# Patient Record
Sex: MALE | Race: WHITE | NOT HISPANIC OR LATINO | Employment: UNEMPLOYED | URBAN - METROPOLITAN AREA
[De-identification: names, ages, dates, MRNs, and addresses within clinical notes are randomized per-mention and may not be internally consistent; named-entity substitution may affect disease eponyms.]

---

## 2017-01-18 ENCOUNTER — HOSPITAL ENCOUNTER (EMERGENCY)
Facility: CLINIC | Age: 32
Discharge: HOME | End: 2017-01-18
Attending: NURSE PRACTITIONER | Admitting: NURSE PRACTITIONER
Payer: COMMERCIAL

## 2017-01-18 VITALS
HEART RATE: 88 BPM | SYSTOLIC BLOOD PRESSURE: 151 MMHG | DIASTOLIC BLOOD PRESSURE: 97 MMHG | WEIGHT: 300 LBS | HEIGHT: 68 IN | BODY MASS INDEX: 45.47 KG/M2 | RESPIRATION RATE: 14 BRPM | OXYGEN SATURATION: 98 % | TEMPERATURE: 97.1 F

## 2017-01-18 DIAGNOSIS — K04.7 DENTAL ABSCESS: Primary | ICD-10-CM

## 2017-01-18 PROCEDURE — 99283 EMERGENCY DEPT VISIT LOW MDM: CPT | Mod: 25

## 2017-01-18 PROCEDURE — 41800 DRAINAGE OF GUM LESION: CPT | Performed by: NURSE PRACTITIONER

## 2017-01-18 PROCEDURE — 41800 DRAINAGE OF GUM LESION: CPT

## 2017-01-18 PROCEDURE — 99284 EMERGENCY DEPT VISIT MOD MDM: CPT | Mod: 25 | Performed by: NURSE PRACTITIONER

## 2017-01-18 RX ORDER — OXYCODONE AND ACETAMINOPHEN 5; 325 MG/1; MG/1
1-2 TABLET ORAL EVERY 6 HOURS PRN
Qty: 15 TABLET | Refills: 0 | Status: SHIPPED | OUTPATIENT
Start: 2017-01-18 | End: 2018-01-12

## 2017-01-18 RX ORDER — CLINDAMYCIN HCL 300 MG
300 CAPSULE ORAL 4 TIMES DAILY
Qty: 40 CAPSULE | Refills: 0 | Status: SHIPPED | OUTPATIENT
Start: 2017-01-18 | End: 2017-01-28

## 2017-01-18 ASSESSMENT — ENCOUNTER SYMPTOMS: FACIAL SWELLING: 1

## 2017-01-18 NOTE — ED PROVIDER NOTES
History     Chief Complaint   Patient presents with     Dental Problem     The history is provided by the patient.     Jorge Madrid is a 31 year old male who presents to the ED with dental pain. Patient was woken up at 0100 this morning with pain to left upper tooth. He does have minimal left sided facial swelling. He currently does not have a dentist.  Patient denies any other associated symptoms such as nausea, vomiting, fever, chills.    Patient Active Problem List   Diagnosis     Obesity     Bipolar disorder (H)     Tobacco use disorder     Acquired deviated nasal septum     Obesity     ADHD (attention deficit hyperactivity disorder)     Bipolar 2 disorder (H)     Alcohol abuse     Chemical dependency (H)     CARDIOVASCULAR SCREENING; LDL GOAL LESS THAN 160     Past Medical History   Diagnosis Date     Bipolar 2 disorder (H) 2004     ADHD (attention deficit hyperactivity disorder) 2004     Obesity      Tobacco use disorder      Deviated nasal septum        Past Surgical History   Procedure Laterality Date     Surgical history of -   1997     Meatoplasty with urethral dilatation.       Family History   Problem Relation Age of Onset     DIABETES Maternal Grandmother      CEREBROVASCULAR DISEASE Maternal Grandmother      HEART DISEASE Paternal Grandfather        Social History   Substance Use Topics     Smoking status: Current Every Day Smoker -- 0.50 packs/day for 5 years     Smokeless tobacco: Never Used     Alcohol Use: Yes      Comment: rare        Immunization History   Administered Date(s) Administered     MMR 07/15/1991, 03/31/1998     TD (ADULT, 7+) 07/15/1991, 08/31/1998, 04/17/2004          Allergies   Allergen Reactions     No Known Drug Allergies        Current Outpatient Prescriptions   Medication Sig Dispense Refill     IBUPROFEN PO Take 800 mg by mouth every 8 hours as needed for moderate pain         I have reviewed the Medications, Allergies, Past Medical and Surgical History, and Social  "History in the Epic system.    Review of Systems   HENT: Positive for dental problem (left upper) and facial swelling (left side).    All other systems reviewed and are negative.      Physical Exam   BP: (!) 151/97 mmHg  Pulse: 90  Temp: 97.1  F (36.2  C)  Resp: 14  Height: 172.7 cm (5' 8\")  Weight: 136.079 kg (300 lb)  SpO2: 98 %  Physical Exam   Constitutional: He is oriented to person, place, and time. He appears well-developed and well-nourished. No distress.   HENT:   Head: Normocephalic and atraumatic.   Right Ear: Tympanic membrane and external ear normal.   Left Ear: Tympanic membrane and external ear normal.   Mouth/Throat: Oropharynx is clear and moist. No oropharyngeal exudate.   Significant gum erythema to left upper mouth with obvious fluctuant abscess above tooth # 10 & 11.    Eyes: Conjunctivae are normal. Pupils are equal, round, and reactive to light.   Cardiovascular: Normal rate.    Pulmonary/Chest: Effort normal.   Neurological: He is alert and oriented to person, place, and time.   Skin: Skin is warm and dry. No rash noted. He is not diaphoretic.   Psychiatric: He has a normal mood and affect. His behavior is normal.   Nursing note and vitals reviewed.      ED Course   Incision + drainage  Date/Time: 1/19/2017 1:31 PM  Performed by: ANGELA CONROY  Authorized by: ANGELA CONROY  Consent: Verbal consent obtained.  Consent given by: patient  Patient understanding: patient states understanding of the procedure being performed  Patient identity confirmed: verbally with patient  Type: abscess  Body area: mouth  Local anesthetic: attempted Marcaine, patient did not tolerate, used topical   Patient sedated: no  Scalpel size: 11  Incision type: single straight  Incision depth: subcutaneous  Complexity: simple  Drainage: purulent  Drainage amount: moderate  Wound treatment: wound left open  Patient tolerance: Patient tolerated the procedure well with no immediate complications        No " results found for this or any previous visit (from the past 24 hour(s)).  Medications - No data to display      Assessments & Plan (with Medical Decision Making)  Jorge is a 31-year-old male with a history of bipolar disorder, ADHD and obesity who presents to the emergency department today with complaints of left upper tooth pain and facial swelling that started last evening.  Please refer to HPI on focused exam, patient on exam is well-hydrated, he is nontoxic-appearing, he does have some minimal left upper facial swelling, obvious abscess to upper gum noted on exam as noted above.  Abscess was drained without difficulty.  Large amounts of purulent drainage was removed.  I discussed with patient wound care and warm water swishes throughout the day.  I will start patient on clindamycin and provide him with a small supply of Percocet for severe pain.  I did encourage patient to continue with ibuprofen as well.  Patient should see a dentist in the next week, reasons to return to the emergency department were discussed, patient is agreeable to plan of care, questions were answered prior to discharge.    Patient discharged from the emergency department in stable condition.       I have reviewed the nursing notes.    I have reviewed the findings, diagnosis, plan and need for follow up with the patient.    Discharge Medication List as of 1/18/2017  5:51 PM      START taking these medications    Details   clindamycin (CLEOCIN) 300 MG capsule Take 1 capsule (300 mg) by mouth 4 times daily for 10 days, Disp-40 capsule, R-0, E-Prescribe      oxyCODONE-acetaminophen (PERCOCET) 5-325 MG per tablet Take 1-2 tablets by mouth every 6 hours as needed for moderate to severe pain, Disp-15 tablet, R-0, Local Print             Final diagnoses:   Dental abscess   This document serves as a record of services personally performed by Brittaney Garzon AP*. It was created on their behalf by Sintia Dallas, a trained medical scribe.  The creation of this record is based on the provider's personal observations and the statements of the patient. This document has been checked and approved by the attending provider.   Note: Chart documentation done in part with Dragon Voice Recognition software. Although reviewed after completion, some word and grammatical errors may remain.        1/18/2017   [facility] EMERGENCY DEPARTMENT      Brittaney Garzon APRN CNP  01/19/17 0269

## 2017-01-18 NOTE — ED AVS SNAPSHOT
[facility] Emergency Department    [address] [place] [address]    Phone:  [phone]    Fax:  [phone]                                       Jorge Madrid   MRN: [id number]    Department:  [facility] Emergency Department   Date of Visit:  1/18/2017           After Visit Summary Signature Page     I have received my discharge instructions, and my questions have been answered. I have discussed any challenges I see with this plan with the nurse or doctor.    ..........................................................................................................................................  Patient/Patient Representative Signature      ..........................................................................................................................................  Patient Representative Print Name and Relationship to Patient    ..................................................               ................................................  Date                                            Time    ..........................................................................................................................................  Reviewed by Signature/Title    ...................................................              ..............................................  Date                                                            Time

## 2017-01-18 NOTE — ED AVS SNAPSHOT
[facility] Emergency Department    [address] [place] [address]    Phone:  [phone]    Fax:  [phone]                                       Jorge Madrid   MRN: [id number]    Department:  [facility] Emergency Department   Date of Visit:  1/18/2017           Patient Information     Date Of Birth          1985        Your diagnoses for this visit were:     Dental abscess        You were seen by Brittaney Garzon, COURTNEY CNP.      Follow-up Information     Follow up with Tobias Pond MD.    Specialty:  Family Practice    Why:  As needed.  Dentist in 5-7 days    Contact information:    XXX RESIGNED XXX  [address]   [place] [address] [phone]          Discharge Instructions         Dental Abscess    A dental abscess is an infection of the tooth socket. It often starts with a crack or cavity in the tooth. A pocket of pus forms between the tooth and the bone. The infection causes pain and swelling of the gum, cheek, or jaw. The pain is often made worse by drinking hot or cold fluids, or biting on hard foods. Pain may be felt in the facial sinus or in the ear. A severe infection can interfere with swallowing and breathing.  Causes    Cavities    Trauma    Previous dental work  Symptoms    Pain    Swelling around the tooth or face and cheek    Redness    Bad breath    Bad taste in the mouth    Fever  You will be started on an antibiotic. However, final treatment requires drainage of the pus. This can be done by removing the tooth or performing a root canal. A root canal is done by an oral surgeon. It involves drilling an opening in the tooth to drain the pus. After the infection has healed, a crown is placed over the tooth.  Home care  The following guidelines will help you care for your abscess at home:    Avoid hot and cold foods and liquids, since your tooth may be sensitive to temperature changes.    If your tooth is chipped or cracked, or if  "there is a large open cavity, apply oil of cloves (available over-the-counter in drug stores) directly to the tooth to reduce pain. Some drugstores carry an over-the-counter \"toothache kit.\" This contains oil of cloves and a paste, which can be applied over the exposed tooth to decrease sensitivity.    Apply an ice pack (ice cubes in a plastic bag, wrapped in a towel) over the injured area for 10 to 20 minutes every 1 to 2 hours the first day for pain relief. Continue this 3 to 4 times a day until the pain and swelling goes away.    You can take acetaminophen or ibuprofen for pain, unless you were given a different pain medicine to use. (Note: If you have chronic liver or kidney disease, have ever had a stomach ulcer or gastrointestinal bleeding, or are taking blood-thinning medicines, talk with your healthcare provider before using these medicines.)    An antibiotic will be prescribed. Take it as directed until completed, even if you are feeling better sooner.  Follow-up care  Follow up as advised with a dentist or oral surgeon. Even though your pain may improve with the treatment given today, only a dentist or oral surgeon can provide full treatment for this problem.    If a culture was done, you will be notified if the treatment needs to be changed. You can call in as directed for the results.    If X-rays were taken, they will be reviewed by a specialist. You will be notified of the results, especially if they affect treatment.  Call 911  Call emergency services right away if any of these occur:    Trouble breathing or swallowing, or wheezing    Hoarse voice or trouble speaking    Confusion    Extreme drowsiness or trouble awakening    Fainting or loss of consciousness    Rapid heart rate  When to seek medical advice  Call your healthcare provider right away if any of these occur:    Your face or eyelid becomes swollen or red.    Pain worsens or spreads to the neck.    You develop a fever of 100.4 F (38 C) or " higher.    You have unusual drowsiness, a headache or stiff neck, or weakness.    Pus drains from the gum or tooth.    You are unable to open your mouth wide.    5409-1342 [employer]. [address] [place] [address], [place] [address]. All rights reserved. This information is not intended as a substitute for professional medical care. Always follow your healthcare professional's instructions.          24 Hour Appointment Hotline       To make an appointment at any [facility], call [phone] ([phone]). If you don't have a family doctor or clinic, we will help you find one. [facility] are conveniently located to serve the needs of you and your family.             Review of your medicines      START taking        Dose / Directions Last dose taken    clindamycin 300 MG capsule   Commonly known as:  CLEOCIN   Dose:  300 mg   Quantity:  40 capsule        Take 1 capsule (300 mg) by mouth 4 times daily for 10 days   Refills:  0        oxyCODONE-acetaminophen 5-325 MG per tablet   Commonly known as:  PERCOCET   Dose:  1-2 tablet   Quantity:  15 tablet        Take 1-2 tablets by mouth every 6 hours as needed for moderate to severe pain   Refills:  0          Our records show that you are taking the medicines listed below. If these are incorrect, please call your family doctor or clinic.        Dose / Directions Last dose taken    IBUPROFEN PO   Dose:  800 mg        Take 800 mg by mouth every 8 hours as needed for moderate pain   Refills:  0                Prescriptions were sent or printed at these locations (2 Prescriptions)                   [facility] [place] [address] [address]   [address], [facility] [address]    Telephone:  [phone]   Fax:  [phone]   Hours:                  E-Prescribed (1 of 2)         clindamycin (CLEOCIN) 300 MG capsule                 Printed at Department/Unit printer (1 of 2)         oxyCODONE-acetaminophen (PERCOCET) 5-325 MG per  "tablet                Orders Needing Specimen Collection     None      Pending Results     No orders found from 2017 to 2017.            Pending Culture Results     No orders found from 2017 to 2017.            Thank you for choosing [facility]       Thank you for choosing [facility] for your care. Our goal is always to provide you with excellent care. Hearing back from our patients is one way we can continue to improve our services. Please take a few minutes to complete the written survey that you may receive in the mail after you visit with us. Thank you!        [employer] Information     [employer] lets you send messages to your doctor, view your test results, renew your prescriptions, schedule appointments and more. To sign up, go to [email or web address] . Click on \"Log in\" on the left side of the screen, which will take you to the Welcome page. Then click on \"Sign up Now\" on the right side of the page.     You will be asked to enter the access code listed below, as well as some personal information. Please follow the directions to create your username and password.     Your access code is: [email or web address]  Expires: 2017  5:51 PM     Your access code will  in 90 days. If you need help or a new code, please call your [facility] clinic or [phone].        Care EveryWhere ID     This is your Care EveryWhere ID. This could be used by other organizations to access your [facility] medical records  [id number]        After Visit Summary       This is your record. Keep this with you and show to your community pharmacist(s) and doctor(s) at your next visit.                  "

## 2017-01-18 NOTE — DISCHARGE INSTRUCTIONS
"  Dental Abscess    A dental abscess is an infection of the tooth socket. It often starts with a crack or cavity in the tooth. A pocket of pus forms between the tooth and the bone. The infection causes pain and swelling of the gum, cheek, or jaw. The pain is often made worse by drinking hot or cold fluids, or biting on hard foods. Pain may be felt in the facial sinus or in the ear. A severe infection can interfere with swallowing and breathing.  Causes    Cavities    Trauma    Previous dental work  Symptoms    Pain    Swelling around the tooth or face and cheek    Redness    Bad breath    Bad taste in the mouth    Fever  You will be started on an antibiotic. However, final treatment requires drainage of the pus. This can be done by removing the tooth or performing a root canal. A root canal is done by an oral surgeon. It involves drilling an opening in the tooth to drain the pus. After the infection has healed, a crown is placed over the tooth.  Home care  The following guidelines will help you care for your abscess at home:    Avoid hot and cold foods and liquids, since your tooth may be sensitive to temperature changes.    If your tooth is chipped or cracked, or if there is a large open cavity, apply oil of cloves (available over-the-counter in drug stores) directly to the tooth to reduce pain. Some drugstores carry an over-the-counter \"toothache kit.\" This contains oil of cloves and a paste, which can be applied over the exposed tooth to decrease sensitivity.    Apply an ice pack (ice cubes in a plastic bag, wrapped in a towel) over the injured area for 10 to 20 minutes every 1 to 2 hours the first day for pain relief. Continue this 3 to 4 times a day until the pain and swelling goes away.    You can take acetaminophen or ibuprofen for pain, unless you were given a different pain medicine to use. (Note: If you have chronic liver or kidney disease, have ever had a stomach ulcer or gastrointestinal bleeding, or are " taking blood-thinning medicines, talk with your healthcare provider before using these medicines.)    An antibiotic will be prescribed. Take it as directed until completed, even if you are feeling better sooner.  Follow-up care  Follow up as advised with a dentist or oral surgeon. Even though your pain may improve with the treatment given today, only a dentist or oral surgeon can provide full treatment for this problem.    If a culture was done, you will be notified if the treatment needs to be changed. You can call in as directed for the results.    If X-rays were taken, they will be reviewed by a specialist. You will be notified of the results, especially if they affect treatment.  Call 911  Call emergency services right away if any of these occur:    Trouble breathing or swallowing, or wheezing    Hoarse voice or trouble speaking    Confusion    Extreme drowsiness or trouble awakening    Fainting or loss of consciousness    Rapid heart rate  When to seek medical advice  Call your healthcare provider right away if any of these occur:    Your face or eyelid becomes swollen or red.    Pain worsens or spreads to the neck.    You develop a fever of 100.4 F (38 C) or higher.    You have unusual drowsiness, a headache or stiff neck, or weakness.    Pus drains from the gum or tooth.    You are unable to open your mouth wide.    4960-2803 [employer]. [address] [place] [address], [place] [address]. All rights reserved. This information is not intended as a substitute for professional medical care. Always follow your healthcare professional's instructions.

## 2018-01-08 NOTE — PROGRESS NOTES
SUBJECTIVE:                                                    Jorge Madrid is a 32 year old male who presents to clinic today for the following health issues:      HPI    Establish Care  Referral    Problem list and histories reviewed & adjusted, as indicated.  Additional history: as documented    Patient Active Problem List   Diagnosis     Obesity     Bipolar disorder (H)     Tobacco use disorder     Acquired deviated nasal septum     Obesity     ADHD (attention deficit hyperactivity disorder)     Bipolar 2 disorder (H)     CARDIOVASCULAR SCREENING; LDL GOAL LESS THAN 160     H/O cannabis abuse     History of ETOH abuse     Past Surgical History:   Procedure Laterality Date     SURGICAL HISTORY OF -   1997    Meatoplasty with urethral dilatation.       Social History   Substance Use Topics     Smoking status: Current Every Day Smoker     Packs/day: 0.50     Years: 5.00     Smokeless tobacco: Never Used     Alcohol use Yes      Comment: rare     Family History   Problem Relation Age of Onset     DIABETES Maternal Grandmother      CEREBROVASCULAR DISEASE Maternal Grandmother      HEART DISEASE Paternal Grandfather          Current Outpatient Prescriptions   Medication Sig Dispense Refill     divalproex (DEPAKOTE) 500 MG EC tablet Take 1,000 mg by mouth 3 times daily       Amphetamine-Dextroamphetamine (ADDERALL PO)        IBUPROFEN PO Take 800 mg by mouth every 8 hours as needed for moderate pain       Allergies   Allergen Reactions     No Known Drug Allergies      Recent Labs   Lab Test  12/15/09   1313  12/14/09   1238   LDL  135*   --    HDL  65   --    TRIG  92   --    ALT   --   28   TSH  2.35   --       BP Readings from Last 3 Encounters:   01/12/18 136/74   01/18/17 (!) 151/97   01/02/14 126/68    Wt Readings from Last 3 Encounters:   01/12/18 271 lb 1.6 oz (123 kg)   01/18/17 300 lb (136.1 kg)   01/02/14 (!) 313 lb 14.4 oz (142.4 kg)                  Labs reviewed in  "EPIC          ROS:  Constitutional, HEENT, cardiovascular, pulmonary, gi and gu systems are negative, except as otherwise noted.      OBJECTIVE:   /74 (Cuff Size: Adult Large)  Pulse 80  Temp 98.2  F (36.8  C) (Temporal)  Resp 18  Ht 5' 8.5\" (1.74 m)  Wt 271 lb 1.6 oz (123 kg)  BMI 40.62 kg/m2  Body mass index is 40.62 kg/(m^2).  GENERAL: healthy, alert and no distress  HENT: normal cephalic/atraumatic, ear canals and TM's normal, nose and mouth without ulcers or lesions, oropharynx clear and oral mucous membranes moist  NECK: no adenopathy, no asymmetry, masses, or scars and trachea midline and normal to palpation  RESP: lungs clear to auscultation - no rales, rhonchi or wheezes  CV: regular rate and rhythm, normal S1 S2, no S3 or S4, no murmur, click or rub, no peripheral edema and peripheral pulses strong  MS: no gross musculoskeletal defects noted, no edema  PSYCH: fatigued and speech pressured    Diagnostic Test Results:  No results found for this or any previous visit (from the past 24 hour(s)).    ASSESSMENT/PLAN:     1. Tobacco use disorder  Strongly advised cessation.  - TOBACCO CESSATION - FOR HEALTH MAINTENANCE    2. Attention deficit hyperactivity disorder (ADHD), combined type  3. Bipolar 2 disorder (H)  4. History of ETOH abuse  5. H/O cannabis abuse  At this point in time due to the combination medication that he is requesting and his history of substance abuse I would decline to feel any type of scheduled medication.  This needs to be managed by a psychiatrist or his designated provider.  We reviewed the record from 2009 and note that at one point in time is thought that he had been on IV drug and he flatly denies this at this point in time.  I corrected to the best of my ability but note that the presence of this note is definitely in the chart.  He denies IV drug use categorically.  - MENTAL HEALTH REFERRAL  - Adult; Psychiatry and Medication Management; Psychiatry; [facility]: Psychiatry " [facility] [phone]; We will contact you to schedule the appointment or please call with any questions    Return office visit for full physical and willing to take care of this patient's physical needs however at this point time from psychiatric standpoint I may be willing to do 1 month worth of his Adderall but no more than that.  He would also have to subject to a urine toxicology screen at the time of prescription.Answers for HPI/ROS submitted by the patient on 1/12/2018   PHQ-2 Score: 2  If you checked off any problems, how difficult have these problems made it for you to do your work, take care of things at home, or get along with other people?: Not difficult at all  PHQ9 TOTAL SCORE: 0  CECIL 7 TOTAL SCORE: 6      Jonathan Cristina PA-C  [facility] [place]

## 2018-01-12 ENCOUNTER — OFFICE VISIT (OUTPATIENT)
Dept: FAMILY MEDICINE | Facility: OTHER | Age: 33
End: 2018-01-12
Payer: COMMERCIAL

## 2018-01-12 VITALS
BODY MASS INDEX: 40.15 KG/M2 | HEART RATE: 80 BPM | SYSTOLIC BLOOD PRESSURE: 136 MMHG | HEIGHT: 69 IN | WEIGHT: 271.1 LBS | TEMPERATURE: 98.2 F | RESPIRATION RATE: 18 BRPM | DIASTOLIC BLOOD PRESSURE: 74 MMHG

## 2018-01-12 DIAGNOSIS — F17.200 TOBACCO USE DISORDER: Primary | ICD-10-CM

## 2018-01-12 DIAGNOSIS — F31.81 BIPOLAR 2 DISORDER (H): ICD-10-CM

## 2018-01-12 DIAGNOSIS — F10.11 HISTORY OF ETOH ABUSE: ICD-10-CM

## 2018-01-12 DIAGNOSIS — F12.11 H/O CANNABIS ABUSE: ICD-10-CM

## 2018-01-12 DIAGNOSIS — F90.2 ATTENTION DEFICIT HYPERACTIVITY DISORDER (ADHD), COMBINED TYPE: ICD-10-CM

## 2018-01-12 PROCEDURE — 99202 OFFICE O/P NEW SF 15 MIN: CPT | Performed by: PHYSICIAN ASSISTANT

## 2018-01-12 RX ORDER — DIVALPROEX SODIUM 500 MG/1
1000 TABLET, DELAYED RELEASE ORAL 3 TIMES DAILY
COMMUNITY
End: 2018-06-11

## 2018-01-12 ASSESSMENT — ANXIETY QUESTIONNAIRES
1. FEELING NERVOUS, ANXIOUS, OR ON EDGE: MORE THAN HALF THE DAYS
6. BECOMING EASILY ANNOYED OR IRRITABLE: NOT AT ALL
7. FEELING AFRAID AS IF SOMETHING AWFUL MIGHT HAPPEN: NOT AT ALL
GAD7 TOTAL SCORE: 6
GAD7 TOTAL SCORE: 6
2. NOT BEING ABLE TO STOP OR CONTROL WORRYING: MORE THAN HALF THE DAYS
3. WORRYING TOO MUCH ABOUT DIFFERENT THINGS: MORE THAN HALF THE DAYS
4. TROUBLE RELAXING: NOT AT ALL
GAD7 TOTAL SCORE: 6
5. BEING SO RESTLESS THAT IT IS HARD TO SIT STILL: NOT AT ALL
7. FEELING AFRAID AS IF SOMETHING AWFUL MIGHT HAPPEN: NOT AT ALL

## 2018-01-12 ASSESSMENT — PATIENT HEALTH QUESTIONNAIRE - PHQ9
SUM OF ALL RESPONSES TO PHQ QUESTIONS 1-9: 0
SUM OF ALL RESPONSES TO PHQ QUESTIONS 1-9: 0
10. IF YOU CHECKED OFF ANY PROBLEMS, HOW DIFFICULT HAVE THESE PROBLEMS MADE IT FOR YOU TO DO YOUR WORK, TAKE CARE OF THINGS AT HOME, OR GET ALONG WITH OTHER PEOPLE: NOT DIFFICULT AT ALL

## 2018-01-12 ASSESSMENT — PAIN SCALES - GENERAL: PAINLEVEL: NO PAIN (0)

## 2018-01-12 NOTE — LETTER
My Depression Action Plan  Name: Jorge Madrid   Date of Birth 1985  Date: 1/8/2018    My doctor: Yesi Lama   My clinic: [facility]  [address] [address] [phone]          GREEN    ZONE   Good Control    What it looks like:     Things are going generally well. You have normal up s and down s. You may even feel depressed from time to time, but bad moods usually last less than a day.   What you need to do:  1. Continue to care for yourself (see self care plan)  2. Check your depression survival kit and update it as needed  3. Follow your physician s recommendations including any medication.  4. Do not stop taking medication unless you consult with your physician first.           YELLOW         ZONE Getting Worse    What it looks like:     Depression is starting to interfere with your life.     It may be hard to get out of bed; you may be starting to isolate yourself from others.    Symptoms of depression are starting to last most all day and this has happened for several days.     You may have suicidal thoughts but they are not constant.   What you need to do:     1. Call your care team, your response to treatment will improve if you keep your care team informed of your progress. Yellow periods are signs an adjustment may need to be made.     2. Continue your self-care, even if you have to fake it!    3. Talk to someone in your support network    4. Open up your depression survival kit           RED    ZONE Medical Alert - Get Help    What it looks like:     Depression is seriously interfering with your life.     You may experience these or other symptoms: You can t get out of bed most days, can t work or engage in other necessary activities, you have trouble taking care of basic hygiene, or basic responsibilities, thoughts of suicide or death that will not go away, self-injurious behavior.     What you need to do:  1. Call your  care team and request a same-day appointment. If they are not available (weekends or after hours) call your local crisis line, emergency room or 911.      Electronically signed by: Anthony Galaviz, January 8, 2018    Depression Self Care Plan / Survival Kit    Self-Care for Depression  Here s the deal. Your body and mind are really not as separate as most people think.  What you do and think affects how you feel and how you feel influences what you do and think. This means if you do things that people who feel good do, it will help you feel better.  Sometimes this is all it takes.  There is also a place for medication and therapy depending on how severe your depression is, so be sure to consult with your medical provider and/ or Behavioral Health Consultant if your symptoms are worsening or not improving.     In order to better manage my stress, I will:    Exercise  Get some form of exercise, every day. This will help reduce pain and release endorphins, the  feel good  chemicals in your brain. This is almost as good as taking antidepressants!  This is not the same as joining a gym and then never going! (they count on that by the way ) It can be as simple as just going for a walk or doing some gardening, anything that will get you moving.      Hygiene   Maintain good hygiene (Get out of bed in the morning, Make your bed, Brush your teeth, Take a shower, and Get dressed like you were going to work, even if you are unemployed).  If your clothes don't fit try to get ones that do.    Diet  I will strive to eat foods that are good for me, drink plenty of water, and avoid excessive sugar, caffeine, alcohol, and other mood-altering substances.  Some foods that are helpful in depression are: complex carbohydrates, B vitamins, flaxseed, fish or fish oil, fresh fruits and vegetables.    Psychotherapy  I agree to participate in Individual Therapy (if recommended).    Medication  If prescribed medications, I agree to  take them.  Missing doses can result in serious side effects.  I understand that drinking alcohol, or other illicit drug use, may cause potential side effects.  I will not stop my medication abruptly without first discussing it with my provider.    Staying Connected With Others  I will stay in touch with my friends, family members, and my primary care provider/team.    Use your imagination  Be creative.  We all have a creative side; it doesn t matter if it s oil painting, sand castles, or mud pies! This will also kick up the endorphins.    Witness Beauty  (AKA stop and smell the roses) Take a look outside, even in mid-winter. Notice colors, textures. Watch the squirrels and birds.     Service to others  Be of service to others.  There is always someone else in need.  By helping others we can  get out of ourselves  and remember the really important things.  This also provides opportunities for practicing all the other parts of the program.    Humor  Laugh and be silly!  Adjust your TV habits for less news and crime-drama and more comedy.    Control your stress  Try breathing deep, massage therapy, biofeedback, and meditation. Find time to relax each day.     My support system    Clinic Contact:  Phone number:    Contact 1:  Phone number:    Contact 2:  Phone number:    Yazid[employer]/:  Phone number:    Therapist:  Phone number:    Local crisis center:    Phone number:    Other community support:  Phone number:

## 2018-01-12 NOTE — MR AVS SNAPSHOT
After Visit Summary   1/12/2018    Jorge Madrid    MRN: [id number]           Patient Information     Date Of Birth          1985        Visit Information        Provider Department      1/12/2018 3:20 PM Jonathan Pérez PA-C [facility]        Today's Diagnoses     Tobacco use disorder    -  1    Attention deficit hyperactivity disorder (ADHD), combined type        Bipolar 2 disorder (H)        History of ETOH abuse        H/O cannabis abuse           Follow-ups after your visit        Additional Services     MENTAL HEALTH REFERRAL  - Adult; Psychiatry and Medication Management; Psychiatry; [facility]: Psychiatry Clinic [phone]; We will contact you to schedule the appointment or please call with any questions       All scheduling is subject to the client's specific insurance plan & benefits, provider/location availability, and provider clinical specialities.  Please arrive 15 minutes early for your first appointment and bring your completed paperwork.    Please be aware that coverage of these services is subject to the terms and limitations of your health insurance plan.  Call member services at your health plan with any benefit or coverage questions.    May consider Miguelina in [employer].                  Who to contact     If you have questions or need follow up information about today's clinic visit or your schedule please contact [facility] directly at [phone].  Normal or non-critical lab and imaging results will be communicated to you by MyChart, letter or phone within 4 business days after the clinic has received the results. If you do not hear from us within 7 days, please contact the clinic through MyChart or phone. If you have a critical or abnormal lab result, we will notify you by phone as soon as possible.  Submit refill requests through [employer] or call your pharmacy and they will forward the refill request to us. Please allow 3 business days for  "your refill to be completed.          Additional Information About Your Visit        [facility] Information     [employer] lets you send messages to your doctor, view your test results, renew your prescriptions, schedule appointments and more. To sign up, go to [email or web address] . Click on \"Log in\" on the left side of the screen, which will take you to the Welcome page. Then click on \"Sign up Now\" on the right side of the page.     You will be asked to enter the access code listed below, as well as some personal information. Please follow the directions to create your username and password.     Your access code is: [email or web address]  Expires: 3/27/2018  3:14 PM     Your access code will  in 90 days. If you need help or a new code, please call your [place] clinic or [phone].        Care EveryWhere ID     This is your [facility] EveryWhere ID. This could be used by other organizations to access your [place] medical records  [id number]        Your Vitals Were     Pulse Temperature Respirations Height BMI (Body Mass Index)       80 98.2  F (36.8  C) (Temporal) 18 5' 8.5\" (1.74 m) 40.62 kg/m2        Blood Pressure from Last 3 Encounters:   18 136/74   17 (!) 151/97   14 126/68    Weight from Last 3 Encounters:   18 271 lb 1.6 oz (123 kg)   17 300 lb (136.1 kg)   14 (!) 313 lb 14.4 oz (142.4 kg)              We Performed the Following     DEPRESSION ACTION PLAN (DAP)     MENTAL HEALTH REFERRAL  - Adult; Psychiatry and Medication Management; Psychiatry; [facility]: Psychiatry Clinic [phone]; We will contact you to schedule the appointment or please call with any questions     TOBACCO CESSATION - FOR HEALTH MAINTENANCE          Today's Medication Changes          These changes are accurate as of: 18  3:42 PM.  If you have any questions, ask your nurse or doctor.               Stop taking these medicines if you haven't already. Please contact your care team if you have questions.  "    oxyCODONE-acetaminophen 5-325 MG per tablet   Commonly known as:  PERCOCET   Stopped by:  Jonathan Pérez PA-C                    Primary Care Provider Office Phone # Fax #    COURTNEY Mims [phone] [address] [facility] [address]        Equal Access to Services     [facility]: Hadii aad ku hadasho Soomaali, waaxda luqadaha, qaybta kaalmada adeegyada, waxay idiin hayaan adeeg kharash la'aan . So [facility] [phone].    ATENCIÓN: Si habla español, tiene a winter disposición servicios gratuitos de asistencia lingüística. Llame al [phone].    We comply with applicable federal civil rights laws and [place] laws. We do not discriminate on the basis of race, color, national origin, age, disability, sex, sexual orientation, or gender identity.            Thank you!     Thank you for choosing [facility]  for your care. Our goal is always to provide you with excellent care. Hearing back from our patients is one way we can continue to improve our services. Please take a few minutes to complete the written survey that you may receive in the mail after your visit with us. Thank you!             Your Updated Medication List - Protect others around you: Learn how to safely use, store and throw away your medicines at www.disposemymeds.org.          This list is accurate as of: 1/12/18  3:42 PM.  Always use your most recent med list.                   Brand Name Dispense Instructions for use Diagnosis    ADDERALL PO           divalproex 500 MG EC tablet    DEPAKOTE     Take 1,000 mg by mouth 3 times daily        IBUPROFEN PO      Take 800 mg by mouth every 8 hours as needed for moderate pain

## 2018-01-12 NOTE — NURSING NOTE
"Chief Complaint   Patient presents with     Establish Care     Referral     Panel Management     sammi, mychart, flu shot, tobacco cessation, dap, phq, vito       Initial /74 (Cuff Size: Adult Large)  Pulse 80  Temp 98.2  F (36.8  C) (Temporal)  Resp 18  Ht 5' 8.5\" (1.74 m)  Wt 271 lb 1.6 oz (123 kg)  BMI 40.62 kg/m2 Estimated body mass index is 40.62 kg/(m^2) as calculated from the following:    Height as of this encounter: 5' 8.5\" (1.74 m).    Weight as of this encounter: 271 lb 1.6 oz (123 kg).  Medication Reconciliation: complete  "

## 2018-01-13 ASSESSMENT — ANXIETY QUESTIONNAIRES: GAD7 TOTAL SCORE: 6

## 2018-01-13 ASSESSMENT — PATIENT HEALTH QUESTIONNAIRE - PHQ9: SUM OF ALL RESPONSES TO PHQ QUESTIONS 1-9: 0

## 2018-02-12 ENCOUNTER — TELEPHONE (OUTPATIENT)
Dept: PSYCHIATRY | Facility: CLINIC | Age: 33
End: 2018-02-12

## 2018-02-12 ENCOUNTER — OFFICE VISIT (OUTPATIENT)
Dept: PSYCHIATRY | Facility: CLINIC | Age: 33
End: 2018-02-12
Attending: PSYCHIATRY & NEUROLOGY
Payer: COMMERCIAL

## 2018-02-12 VITALS
HEART RATE: 87 BPM | DIASTOLIC BLOOD PRESSURE: 84 MMHG | WEIGHT: 262.6 LBS | BODY MASS INDEX: 39.34 KG/M2 | SYSTOLIC BLOOD PRESSURE: 136 MMHG

## 2018-02-12 DIAGNOSIS — F90.2 ATTENTION DEFICIT HYPERACTIVITY DISORDER (ADHD), COMBINED TYPE: ICD-10-CM

## 2018-02-12 PROCEDURE — G0463 HOSPITAL OUTPT CLINIC VISIT: HCPCS | Mod: ZF

## 2018-02-12 ASSESSMENT — PAIN SCALES - GENERAL: PAINLEVEL: NO PAIN (0)

## 2018-02-12 NOTE — TELEPHONE ENCOUNTER
On 2/12/2018 the patient signed an DENZEL authorizing the release of all pertinent records from [facility] to [facility] for the purpose of continuing care.  Dr. Abernathy faxed this DENZEL to [phone] on 2/12/2018, I sent the original to scanning and held a copy in Psychiatry until scanning complete/confirmed .Saundra Barnes LPN

## 2018-02-12 NOTE — NURSING NOTE
Chief Complaint   Patient presents with     Eval/Assessment     ADHD     Reviewed allergies, medications, pharmacy and smoking status.  Administered abuse screening questions  Obtained weight, pain level, blood pressure and heart rate

## 2018-02-12 NOTE — PROGRESS NOTES
"  Psychiatry Clinic Medical Diagnostic Assessment               Jorge Madrid is a 32 year old male  Therapist: None  PCP: Jonathan Dodd  Other Providers: None  Referred by Jonathan Dodd for evaluation of possible bipolar disorder and ADHD.      History was provided by patient who was a fair to poor historian.    Patient arrived 25 minutes late     Chief Complaint                                                                                                             \"I want to get my mental health back in track\"     History of Present Illness                                                                                   4, 4      Pertinent Background:  This patient reportedly has been diagnosed with ADHD and Bipolar II Disorder in 2012.  Psych critical item history includes substance use treatment  .     He wants to make his mental health a priority.  He reports he was diagnosed with ADHD and Bipolar Disorder, depressive type at age 23.  He reported that he had testing completed at [facility] in [place] and saw saw Dr. Addie Harrington but she retired.  He is currently not on any medications since 2012.  He was on Adderall 60-80 mg daily and Depakote 1000 mg in the past.  That was the first time he ever tried medications before.  He tried lithium and reported it made him feel in a fog, Seroquel- sleep eating and groggy, trazodone- worked okay but didn't need it.  He reported that he felt Depakote and Adderall were going well and was able to keep a job.  Reports that he has always been on Depakote and Adderall together.  He also reportedly tried bupropion but felt it wasn't helpful and made his mind \"groggy\".  Has never tried Concerta, Vyvanse, Strattera.  States that he has never taken more Adderall than prescribed except for once when he forgot that he took it already that day.  He stopped his medications because he missed too many appointments.      He remembers in high school he would stay " "in the art class and would doing really well for a period of time.  He reports feeling down in 5th grade after his grandfather passed away.  He saw a therapist at the [facility] in 8th grade but he wasn't very open to talking to her.  He reports that he feels good for a few hours at a time, states that last occurred in last summer.   He reports that this summer he felt fulfilled and \"was able to keep it together\" and had a job that was pretty accommodating with his lateness.  He stopped working there after he and his ex-wife broke up again.  Denies any history of hearing voices or seeing things others don't.  He describes his \"ups\" as times where he feels more confident, feeling witty, sleep is fine, he buys things that he shouldn't, a little more hyper, \"but mostly in my head\".    Denies times of feeling overly energetic, overly motivated, or feeling too good.  States that other people don't really notice a change in his behavior or mood.  States that he feels \"more social and outgoing\" during these times but sleep is not affected.  The longest that these periods have lasted is a day.      Denies history of suicidal ideation, homicidal ideation, self-injurious behavior, inpatient psychiatric hospitalization.    Describes that his mood as feeling \"unfulfilled\" and \"unmotivated\".  Reports that his sleep stays pretty same and appetite stays the same, states that this is pretty \"situational\" and feels better when he has a routine and pulls his own weight.     In regards to ADHD, he does report that he has difficulty sustaining attention in tasks, has difficulty organizing tasks and activities, loses things, interrupts others, does not follow through on instructions, fidgeting all since childhood.  He was a self-described \"class clown\"    Recent Symptoms:   Depression:  anhedonia, low energy, poor concentration /memory and feeling worthless       Recent Substance Use:  Alcohol- 3-4 beers on the " weekend  Tobacco- qtr to half ppd       Caffeine- a couple sodas/day   Opioids- none        Narcan Kit- No  Cannabis- none   Other Illicit Drugs- none     Substance Use History                                                                 Past Use- cannabis daily, 12 pack beer in one day    Had a history of smoking weed daily and went to treatment in 2012        Psychiatric History     None      Psychiatric Medication Trials     See HPI above    Social/ Family History               [per patient report]                                                       1ea, 1ea     FINANCIAL SUPPORT- unemployed, living with parents       CHILDREN- 5 year-old son.  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 in [place]  SOCIAL/ SPIRITUAL SUPPORT- family       CULTURAL INFLUENCES/ IMPACT- n/a       TRAUMA HISTORY (self-report)- None  FEELS SAFE AT HOME- Yes  FAMILY HISTORY-  Maternal Grandmother with schizophrenia and depression on paternal side of the family    Medical / Surgical History                                                                                                                     Patient Active Problem List   Diagnosis     Class 3 obesity due to excess calories without serious comorbidity with body mass index (BMI) of 40.0 to 44.9 in adult (H)     Bipolar disorder (H)     Tobacco use disorder     Acquired deviated nasal septum     Obesity     ADHD (attention deficit hyperactivity disorder)     Bipolar 2 disorder (H)     CARDIOVASCULAR SCREENING; LDL GOAL LESS THAN 160     H/O cannabis abuse     History of ETOH abuse       Past Surgical History:   Procedure Laterality Date      SURGICAL HISTORY OF -   1997    Meatoplasty with urethral dilatation.      Medical Review of Systems                                                                                                       2, 10     A comprehensive review of systems was performed and is negative other than noted in the HPI.    Allergy                                No known drug allergies  Current Medications                                                                                                         Current Outpatient Prescriptions   Medication Sig Dispense Refill     divalproex (DEPAKOTE) 500 MG EC tablet Take 1,000 mg by mouth 3 times daily       Amphetamine-Dextroamphetamine (ADDERALL PO)        IBUPROFEN PO Take 800 mg by mouth every 8 hours as needed for moderate pain       Vitals                                                                                                                           3, 3     /84  Pulse 87  Wt 119.1 kg (262 lb 9.6 oz)  BMI 39.34 kg/m2     Mental Status Exam                                                                                       9, 14 cog gs     Alertness: alert  and oriented  Appearance: adequately groomed  Behavior/Demeanor: cooperative and pleasant, with fair  eye contact   Speech: normal and regular rate and rhythm  Language: intact and no problems  Psychomotor: normal or unremarkable  Mood: description consistent with euthymia  Affect: blunted; was congruent to mood; was congruent to content  Thought Process/Associations: unremarkable  Thought Content:  Reports none;  Denies suicidal ideation, violent ideation and delusions  Perception:  Reports none;  Denies auditory hallucinations and visual hallucinations  Insight: fair  Judgment: fair  Cognition: (6) does  appear grossly intact; formal cognitive testing was not done  Gait and Station: unremarkable    Labs and Data                                                                                                                      Rating Scales:  PHQ9    PHQ9 Today:  12  PHQ-9 SCORE 11/19/2013 1/12/2018   Total Score 1 -   Total Score MyChart - 0   Total Score - 0         No lab results found.  Recent Labs   Lab Test  12/14/09   1238   AST  32   ALT  28   ALKPHOS  56       Diagnosis and Assessment                                                                               m2, h3     Today the following issues were addressed:    1) ADHD- It seems probable that patient would meet criteria for ADHD Combined presentation (inattentive and hyperactive/impulsive).  States that Adderall was helpful in the past, reports that he completed testing at [facility].  He has signed a DENZEL and I have faxed this request for records.  Will contact patient when records arrive, may discuss case further with Dr. Ambrocio.  Discussed that ADHD therapy may be helpful for organizational and time management skills    2) r/o Bipolar II Disorder- Patient reportedly was diagnosed with Bipolar II Disorder at the same time as ADHD.  Patient may have experienced major depressive episodes in the past but it doesn't appear that he has ever met criteria for meeting a hypomanic episode and thus Bipolar II Disorder.  He states that he felt better when taking Depakote and Adderall and reports that he has always taken the two medications concurrently.  It may be that his ADHD symptoms were adequately controlled and that may have improved his mood.  Will be able to further assess after patient's previous records are reviewed.    3) Substance Use- Patient reports a history of Alcohol Use Disorder and Cannabis Use Disorder for which he received treatment for.  States that he occasionally drinks alcohol but does not use any street drugs, will continue to carefully monitor.    [facility] was checked: no controlled substances prescribed in the past year  PSYCHOTROPIC DRUG INTERACTIONS: None.     Plan                                                                                                                        m2, h3     1) No medication changes today, will await patient's testing and records from his previous psychiatric provider to determine next steps.  Will contact patient to schedule a follow up appointment once these records are reviewed    RTC: to be determined    CRISIS NUMBERS:   Provided routinely in AVS.    Treatment Risk Statement:  The patient understands the risks, benefits, adverse effects and alternatives. Agrees to treatment with the capacity to do so. No medical contraindications to treatment. Agrees to call clinic for any problems. The patient understands to call 911 or go to the nearest ED if life threatening or urgent symptoms occur.     WHODAS 2.0  TODAY total score = N/A; [a 12-item WHODAS 2.0 assessment was not completed by the pt today and/or recorded in EPIC].     PROVIDER:  Carolina Abernathy DO    Attestation:  I, Yury Gamboa, personally performed an examination of this patient on February 12, 2018 and I have reviewed the resident's documentation.  I have edited the note to reflect all relevant changes. I agree with resident findings and plan in this resident H&P.  I have reviewed all vitals and laboratory findings.  Jorge gives a convincing history of long-term symptoms of attention deficit-hyperactivity disorder, combined type.  He also describes symptoms possibly consistent with cyclothymia, or perhaps depressive episodes alternating with subthreshold hypomanic periods, but this is less clear.  Jorge has been diagnosed with bipolar 2 disorder in the past.  He has been on a number of mood stabilizing medications over the years, and reports that he has never taken a stimulant except in combination with a mood stabilizing medication.  To clarify whether there is a mood disorder diagnosis, and if so what type, it will be useful to get Jorge's previous medical records to determine how this diagnosis was  fernando Patel is agreeable with this.  We will not suggest any medication changes today.  We will contact him for follow-up when we review his records..  Yury Gamboa

## 2018-02-12 NOTE — MR AVS SNAPSHOT
After Visit Summary   2018    Jorge Madrid    MRN: [id number]           Patient Information     Date Of Birth          1985        Visit Information        Provider Department      2018 9:00 AM Carolina Abernathy, DO Psychiatry Clinic        Today's Diagnoses     Attention deficit hyperactivity disorder (ADHD), combined type           Follow-ups after your visit        Who to contact     Please call your clinic at [phone] to:    Ask questions about your health    Make or cancel appointments    Discuss your medicines    Learn about your test results    Speak to your doctor            Additional Information About Your Visit        MyChart Information     [employer] is an electronic gateway that provides easy, online access to your medical records. With [employer], you can request a clinic appointment, read your test results, renew a prescription or communicate with your care team.     To sign up for [employer] visit the website at [email or web address]   You will be asked to enter the access code listed below, as well as some personal information. Please follow the directions to create your username and password.     Your access code is: [email or web address]  Expires: 3/27/2018  3:14 PM     Your access code will  in 90 days. If you need help or a new code, please contact your [facility] or call [phone] for assistance.        Care EveryWhere ID     This is your Care EveryWhere ID. This could be used by other organizations to access your [facility] medical records  [id number]        Your Vitals Were     Pulse BMI (Body Mass Index)                87 39.34 kg/m2           Blood Pressure from Last 3 Encounters:   18 136/84   18 136/74   17 (!) 151/97    Weight from Last 3 Encounters:   18 119.1 kg (262 lb 9.6 oz)   18 123 kg (271 lb 1.6 oz)   17 136.1 kg (300 lb)              Today, you had the following     No  orders found for display       Primary Care Provider Office Phone # Fax #    Yesi Lama, COURTNEY [facility] [phone] [address] [address] [place] [address]        Equal Access to Services     BR[place] ARITA : Hadii aad ku hadrenettao Soomaali, waaxda luqadaha, qaybta kaalmada adeegyada, waxla idiin hayflexn adeeg felipe villa . So [facility] [phone].    ATENCIÓN: Si habla español, tiene a winter disposición servicios gratuitos de asistencia lingüística. Llame al [phone].    We comply with applicable federal civil rights laws and [place] laws. We do not discriminate on the basis of race, color, national origin, age, disability, sex, sexual orientation, or gender identity.            Thank you!     Thank you for choosing PSYCHIATRY CLINIC  for your care. Our goal is always to provide you with excellent care. Hearing back from our patients is one way we can continue to improve our services. Please take a few minutes to complete the written survey that you may receive in the mail after your visit with us. Thank you!             Your Updated Medication List - Protect others around you: Learn how to safely use, store and throw away your medicines at www.disposemymeds.org.          This list is accurate as of 2/12/18 11:59 PM.  Always use your most recent med list.                   Brand Name Dispense Instructions for use Diagnosis    ADDERALL PO           divalproex sodium delayed-release 500 MG DR tablet    DEPAKOTE     Take 1,000 mg by mouth 3 times daily        IBUPROFEN PO      Take 800 mg by mouth every 8 hours as needed for moderate pain

## 2018-02-13 ASSESSMENT — PATIENT HEALTH QUESTIONNAIRE - PHQ9: SUM OF ALL RESPONSES TO PHQ QUESTIONS 1-9: 12

## 2018-02-23 ENCOUNTER — TELEPHONE (OUTPATIENT)
Dept: PSYCHIATRY | Facility: CLINIC | Age: 33
End: 2018-02-23

## 2018-02-23 NOTE — TELEPHONE ENCOUNTER
Rec'd incoming faxed records from [facility] (5 pages).     Originals routed to Dr. Abernathy for review prior to submission to scanning.

## 2018-02-27 ENCOUNTER — TELEPHONE (OUTPATIENT)
Dept: PSYCHIATRY | Facility: CLINIC | Age: 33
End: 2018-02-27

## 2018-02-28 NOTE — PROGRESS NOTES
Contacted patient earlier today regarding follow up appt. Received some records from patient s former provider but no psychological testing records.  Discussed patient s care with Deena Gamboa and Cristóbal. Patient will be seen in clinic with me on 3/21 at 2 pm and staffed by Dr. Ambrocio for ADHD concerns.

## 2018-03-21 ENCOUNTER — TELEPHONE (OUTPATIENT)
Dept: PSYCHIATRY | Facility: CLINIC | Age: 33
End: 2018-03-21

## 2018-03-21 NOTE — TELEPHONE ENCOUNTER
Left voicemail for patient to let him know the earliest availability I have is Wednesday, April 4th and to call clinic.   Also notified him that March 28th and 30th are not available.

## 2018-03-22 ENCOUNTER — TELEPHONE (OUTPATIENT)
Dept: PSYCHIATRY | Facility: CLINIC | Age: 33
End: 2018-03-22

## 2018-03-22 NOTE — TELEPHONE ENCOUNTER
Spoke to patient who reports that he is able to change his appt to Wednesday, April 4th at 3:00 pm

## 2018-04-04 ENCOUNTER — TELEPHONE (OUTPATIENT)
Dept: PSYCHIATRY | Facility: CLINIC | Age: 33
End: 2018-04-04

## 2018-04-04 NOTE — TELEPHONE ENCOUNTER
Left a voicemail for patient regarding his cancelled appointment.  In the voicemail message, I have let him know that I thoroughly reviewed his records and after further discussion with my supervisor, feel it would be best for him to be seen by one of the Addiction Psychiatry Specialists within our clinic who could assess how to best manage his reported ADHD symptoms in the context of a history of polysubstance abuse.  Jeana Bragg, Stacey, and Doris have been made aware of his case and have agreed to see him for a consultation.  I told patient to contact clinic to schedule, if he is interested.

## 2018-04-20 ENCOUNTER — TELEPHONE (OUTPATIENT)
Dept: PSYCHIATRY | Facility: CLINIC | Age: 33
End: 2018-04-20

## 2018-04-20 NOTE — TELEPHONE ENCOUNTER
If patient requests to schedule an appointment, please do not allow him to schedule, but let me know.

## 2018-05-15 ENCOUNTER — TELEPHONE (OUTPATIENT)
Dept: PSYCHIATRY | Facility: CLINIC | Age: 33
End: 2018-05-15

## 2018-05-15 DIAGNOSIS — F19.10 POLYSUBSTANCE ABUSE (H): Primary | ICD-10-CM

## 2018-05-15 NOTE — TELEPHONE ENCOUNTER
11:30 AM    I have been in consultation with Deena Grant and Jeana about this patient.  I spoke with him today and he profusely apologized for canceling/missing appts and states that he wants to get his life turned around.  Dr. Hills was agreeable to seeing this patient for a 60 minute eval on Tuesday, 5/22 at 2:30 pm.  I contacted the patient and he is agreeable to being here for that date and time.  I let him know that if he needs to cancel, misses, or is more than 15 minutes late, he will not be allowed to reschedule at our clinic and he states that he completely understands.  I will document this all in a telephone note and he was also instructed to have a Urine Drug Screen completed prior to his appt with Dr. Hills and agrees.

## 2018-05-22 ENCOUNTER — OFFICE VISIT (OUTPATIENT)
Dept: PSYCHIATRY | Facility: CLINIC | Age: 33
End: 2018-05-22
Attending: PSYCHIATRY & NEUROLOGY
Payer: COMMERCIAL

## 2018-05-22 VITALS
BODY MASS INDEX: 40.75 KG/M2 | HEART RATE: 86 BPM | WEIGHT: 272 LBS | SYSTOLIC BLOOD PRESSURE: 119 MMHG | DIASTOLIC BLOOD PRESSURE: 77 MMHG

## 2018-05-22 DIAGNOSIS — F90.2 ATTENTION DEFICIT HYPERACTIVITY DISORDER (ADHD), COMBINED TYPE: Primary | ICD-10-CM

## 2018-05-22 PROCEDURE — G0463 HOSPITAL OUTPT CLINIC VISIT: HCPCS | Mod: ZF

## 2018-05-22 ASSESSMENT — PAIN SCALES - GENERAL: PAINLEVEL: NO PAIN (0)

## 2018-05-22 NOTE — MR AVS SNAPSHOT
After Visit Summary   2018    Jorge Madrid    MRN: [id number]           Patient Information     Date Of Birth          1985        Visit Information        Provider Department      2018 2:30 PM Nerissa Hills MD Psychiatry Clinic        Today's Diagnoses     Attention deficit hyperactivity disorder (ADHD), combined type    -  1       Follow-ups after your visit        Who to contact     Please call your clinic at [phone] to:    Ask questions about your health    Make or cancel appointments    Discuss your medicines    Learn about your test results    Speak to your doctor            Additional Information About Your Visit        MyChart Information     [employer] is an electronic gateway that provides easy, online access to your medical records. With [employer], you can request a clinic appointment, read your test results, renew a prescription or communicate with your care team.     To sign up for [employer] visit the website at [email or web address]   You will be asked to enter the access code listed below, as well as some personal information. Please follow the directions to create your username and password.     Your access code is: [email or web address]  Expires: 2018  5:40 PM     Your access code will  in 90 days. If you need help or a new code, please contact your [facility] or call [phone] for assistance.        Care EveryWhere ID     This is your Care EveryWhere ID. This could be used by other organizations to access your [facility] medical records  [id number]        Your Vitals Were     Pulse BMI (Body Mass Index)                86 40.75 kg/m2           Blood Pressure from Last 3 Encounters:   18 119/77   18 136/84   18 136/74    Weight from Last 3 Encounters:   18 123.4 kg (272 lb)   18 119.1 kg (262 lb 9.6 oz)   18 123 kg (271 lb 1.6 oz)              Today, you had the following     No  orders found for display       Primary Care Provider Office Phone # Fax #    Yesi Lama, COURTNEY [facility] [phone] [address] [address] [place] [address]        Equal Access to Services     [place] ARITA : Hadii aad ku hadrenettao Soomaali, waaxda luqadaha, qaybta kaalmada adeegyada, waxla idiin hayflexn adeeg felipe villa . So [facility] [phone].    ATENCIÓN: Si habla español, tiene a winter disposición servicios gratuitos de asistencia lingüística. Llame al [phone].    We comply with applicable federal civil rights laws and [place] laws. We do not discriminate on the basis of race, color, national origin, age, disability, sex, sexual orientation, or gender identity.            Thank you!     Thank you for choosing PSYCHIATRY CLINIC  for your care. Our goal is always to provide you with excellent care. Hearing back from our patients is one way we can continue to improve our services. Please take a few minutes to complete the written survey that you may receive in the mail after your visit with us. Thank you!             Your Updated Medication List - Protect others around you: Learn how to safely use, store and throw away your medicines at www.disposemymeds.org.          This list is accurate as of 5/22/18  5:40 PM.  Always use your most recent med list.                   Brand Name Dispense Instructions for use Diagnosis    ADDERALL PO           divalproex sodium delayed-release 500 MG DR tablet    DEPAKOTE     Take 1,000 mg by mouth 3 times daily        IBUPROFEN PO      Take 800 mg by mouth every 8 hours as needed for moderate pain
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
## 2018-05-23 ENCOUNTER — TELEPHONE (OUTPATIENT)
Dept: PSYCHIATRY | Facility: CLINIC | Age: 33
End: 2018-05-23

## 2018-05-23 DIAGNOSIS — F90.9 ATTENTION DEFICIT HYPERACTIVITY DISORDER (ADHD), UNSPECIFIED ADHD TYPE: Primary | ICD-10-CM

## 2018-05-23 DIAGNOSIS — F19.90 SUBSTANCE USE DISORDER: ICD-10-CM

## 2018-05-23 RX ORDER — ATOMOXETINE 40 MG/1
40 CAPSULE ORAL DAILY
Qty: 30 CAPSULE | Refills: 0 | Status: SHIPPED | OUTPATIENT
Start: 2018-05-23 | End: 2019-12-13

## 2018-05-23 NOTE — TELEPHONE ENCOUNTER
From 5/22/18 clinic note:     MEDICATIONS:         -- Recommend Strattera as non-stimulant medication for ADHD.  He wants to start this prior to entering treatment on Thursday, which I would support.  He will be better able to focus and make progress if he is able to concentrate .  Recommend starting this tomorrow.  Could start at 40 mg, reassess in 2-3 weeks (phone call from treatment vs in person visit?)       -- He does not want any medication for depression presently and he is stable so would support waiting til after treatment.       Will route to Dr. Hills.

## 2018-05-23 NOTE — TELEPHONE ENCOUNTER
On 5/22/2018 the patient completed a Confidential Clinical Questionnaire.  I sent the document to scanning on 5/23/2018 and held a copy in Psychiatry until scanning complete/confirmed. Noemi Krishnamurthy MA

## 2018-05-23 NOTE — TELEPHONE ENCOUNTER
----- Message from Carolina Rivera RN sent at 5/23/2018  2:00 PM CDT -----  Regarding: FW: New Med Orders - Specker (need to be done today 5/23)  Contact: [phone]      ----- Message -----     From: Olesya Mello     Sent: 5/23/2018   1:38 PM       To: Liliana Pérez RN  Subject: New Med Orders - Specker (need to be done to#    Caller:  pt  Relationship to pt: self  Medication:   Vyvanse  How many days left of med do you have left (if >3 day supply, redirect to pharmacy): new medication  Pharmacy and location: [facility]  Pending appt date:  n/a  Okay to leave detailed VM:  yes    Pt is going into treatment tomorrow, can this be done today?  Intake is at 9am.

## 2018-05-23 NOTE — TELEPHONE ENCOUNTER
Discussed patient's plan with Dr. Hills and with patient.      Patient is starting treatment tomorrow for 28 days (so should be completed 6/21).  Patient would like to be seen for follow up after treatment- he is agreeable to coming to appt with Dr. Hills on Tuesday, 6/26 at 4:00 pm.    Sent atomoxetine/Strattera 40 mg daily, qty: 30 with 0 refills, take 1 capsule po daily, for ADHD to [facility]    I have also ordered standing Drug Screen 8 Urine Testing    Pt's phone number : [phone]- I will update in snapshot

## 2018-05-24 ASSESSMENT — PATIENT HEALTH QUESTIONNAIRE - PHQ9: SUM OF ALL RESPONSES TO PHQ QUESTIONS 1-9: 6

## 2018-05-29 NOTE — TELEPHONE ENCOUNTER
Carolina Abernathy DO Specker, Sheila M, MD; Elizabeth Santo RN        Caller: Unspecified (6 days ago,  2:20 PM)                     I already spoke to him last week and the agreement was Strattera

## 2018-06-11 ENCOUNTER — OFFICE VISIT (OUTPATIENT)
Dept: FAMILY MEDICINE | Facility: CLINIC | Age: 33
End: 2018-06-11
Payer: COMMERCIAL

## 2018-06-11 VITALS
TEMPERATURE: 97.5 F | BODY MASS INDEX: 44.7 KG/M2 | HEIGHT: 67 IN | RESPIRATION RATE: 16 BRPM | SYSTOLIC BLOOD PRESSURE: 116 MMHG | DIASTOLIC BLOOD PRESSURE: 70 MMHG | OXYGEN SATURATION: 98 % | HEART RATE: 90 BPM | WEIGHT: 284.8 LBS

## 2018-06-11 DIAGNOSIS — E66.01 MORBID OBESITY (H): ICD-10-CM

## 2018-06-11 DIAGNOSIS — Z00.00 ENCOUNTER FOR ROUTINE ADULT HEALTH EXAMINATION WITHOUT ABNORMAL FINDINGS: Primary | ICD-10-CM

## 2018-06-11 DIAGNOSIS — F31.9 BIPOLAR AFFECTIVE DISORDER, REMISSION STATUS UNSPECIFIED (H): ICD-10-CM

## 2018-06-11 DIAGNOSIS — F10.11 HISTORY OF ETOH ABUSE: ICD-10-CM

## 2018-06-11 DIAGNOSIS — Z11.3 SCREEN FOR STD (SEXUALLY TRANSMITTED DISEASE): ICD-10-CM

## 2018-06-11 DIAGNOSIS — F51.04 PSYCHOPHYSIOLOGICAL INSOMNIA: ICD-10-CM

## 2018-06-11 DIAGNOSIS — F90.0 ATTENTION DEFICIT HYPERACTIVITY DISORDER (ADHD), PREDOMINANTLY INATTENTIVE TYPE: ICD-10-CM

## 2018-06-11 PROCEDURE — 87591 N.GONORRHOEAE DNA AMP PROB: CPT | Performed by: FAMILY MEDICINE

## 2018-06-11 PROCEDURE — 87491 CHLMYD TRACH DNA AMP PROBE: CPT | Performed by: FAMILY MEDICINE

## 2018-06-11 PROCEDURE — 99395 PREV VISIT EST AGE 18-39: CPT | Performed by: FAMILY MEDICINE

## 2018-06-11 RX ORDER — TRAZODONE HYDROCHLORIDE 50 MG/1
50 TABLET, FILM COATED ORAL
Qty: 30 TABLET | Refills: 1 | Status: SHIPPED | OUTPATIENT
Start: 2018-06-11 | End: 2019-04-04

## 2018-06-11 ASSESSMENT — PAIN SCALES - GENERAL: PAINLEVEL: MODERATE PAIN (4)

## 2018-06-11 NOTE — PROGRESS NOTES
SUBJECTIVE:   CC: Jorge Madrid is an 32 year old male who presents for preventative health visit.     Healthy Habits:    Do you get at least three servings of calcium containing foods daily (dairy, green leafy vegetables, etc.)? yes    Amount of exercise or daily activities, outside of work: not lately    Problems taking medications regularly No    Medication side effects: possibly Strattera might be causing him headaches    Have you had an eye exam in the past two years? yes    Do you see a dentist twice per year? no    Do you have sleep apnea, excessive snoring or daytime drowsiness?yes - daytime drowsiness      Jorge is here today for his general physical with a couple of concerns.      1.  ADHD follow-up.  Was started on Strattera by a psychiatrist not too long ago.  Doing well with no side effect.  Need that refilled.  Denies of having depression or anxiety problem.  He was diagnosed with bipolar disorder for years.  He thinks he was misdiagnosis.  Not taking medication and is feeling great.  No mood swing problem.  No concern about it.    2.  Insomnia - trouble with falling and maintaining sleep.  Melatonin has not been effective.  He is currently at the [facility] for inpatient rehab for alcoholism.  States that he has no problem alcohol.  He drank only socially.  He get a DUI and therefore he was instructed for rehab.  The last time he drank was in May 4, 2018.  He used to smoke marijuana but quit in 2009.  No other drug use.    3.  Otherwise, he is doing well. She last physical exam was couple years ago and it was normal.  No major medical care or procedure done since the last physical. She denies of headache or dizziness, No acute visual change. No URI symptoms inlcudes running nose, nasal congestion, coughing, fever or chill. No CP/SOB. No N/V/D/C. Denies of having any problem with urination.  No abdominal pain.  Single and has not been sexually active for 2-3 years.  Denies any risk for  STI and he has no history of it.  No leg swelling and denies of having any pain. Stated that he does not exercises regularly.  Feels safe.   with one child.  No weight change. No other concern today      Today's PHQ-2 Score:   PHQ-2 ( 1999 [employer]) 6/11/2018 1/12/2018   Q1: Little interest or pleasure in doing things 0 1   Q2: Feeling down, depressed or hopeless 0 1   PHQ-2 Score 0 2   Q1: Little interest or pleasure in doing things - Several days   Q2: Feeling down, depressed or hopeless - Several days   PHQ-2 Score - 2       Abuse: Current or Past(Physical, Sexual or Emotional)- No  Do you feel safe in your environment - Yes    Social History   Substance Use Topics     Smoking status: Current Every Day Smoker     Packs/day: 0.50     Years: 17.00     Types: Cigarettes     Smokeless tobacco: Never Used      Comment: started age 15     Alcohol use No      Comment: rare      If you drink alcohol do you typically have >3 drinks per day or >7 drinks per week? No                      Last PSA: No results found for: PSA    Reviewed orders with patient. Reviewed health maintenance and updated orders accordingly - Yes  Patient Active Problem List   Diagnosis     Bipolar disorder (H)     Tobacco use disorder     Acquired deviated nasal septum     ADHD (attention deficit hyperactivity disorder)     Bipolar 2 disorder (H)     H/O cannabis abuse     History of ETOH abuse     Morbid obesity (H)     Tobacco dependence     Past Surgical History:   Procedure Laterality Date     SURGICAL HISTORY OF -   1997    Meatoplasty with urethral dilatation.       Social History   Substance Use Topics     Smoking status: Current Every Day Smoker     Packs/day: 0.50     Years: 17.00     Types: Cigarettes     Smokeless tobacco: Never Used      Comment: started age 15     Alcohol use No      Comment: last use  5/4/18     Family History   Problem Relation Age of Onset     No Known Problems Mother      No Known Problems Father      DIABETES  Maternal Grandmother      CEREBROVASCULAR DISEASE Maternal Grandmother      No Known Problems Maternal Grandfather      No Known Problems Paternal Grandmother      HEART DISEASE Paternal Grandfather          Current Outpatient Prescriptions   Medication Sig Dispense Refill     atomoxetine (STRATTERA) 40 MG capsule Take 1 capsule (40 mg) by mouth daily 30 capsule 0     traZODone (DESYREL) 50 MG tablet Take 1 tablet (50 mg) by mouth nightly as needed for sleep 30 tablet 1     Allergies   Allergen Reactions     No Known Drug Allergies        Reviewed and updated as needed this visit by clinical staff  Tobacco  Allergies  Meds  Soc Hx        Reviewed and updated as needed this visit by Provider        Past Medical History:   Diagnosis Date     ADHD (attention deficit hyperactivity disorder) 2004     Bipolar 2 disorder (H) 2004     Deviated nasal septum      Obesity      Tobacco use disorder       Past Surgical History:   Procedure Laterality Date     SURGICAL HISTORY OF -   1997    Meatoplasty with urethral dilatation.       ROS:  CONSTITUTIONAL: NEGATIVE for fever, chills, change in weight  INTEGUMENTARY/SKIN: NEGATIVE for worrisome rashes, moles or lesions  EYES: NEGATIVE for vision changes or irritation  ENT: NEGATIVE for ear, mouth and throat problems  RESP: NEGATIVE for significant cough or SOB  CV: NEGATIVE for chest pain, palpitations or peripheral edema  GI: NEGATIVE for nausea, abdominal pain, heartburn, or change in bowel habits   male: negative for dysuria, hematuria, decreased urinary stream, erectile dysfunction, urethral discharge  MUSCULOSKELETAL: NEGATIVE for significant arthralgias or myalgia  NEURO: NEGATIVE for weakness, dizziness or paresthesias  ENDOCRINE: NEGATIVE for temperature intolerance, skin/hair changes  HEME/ALLERGY/IMMUNE: NEGATIVE for bleeding problems  PSYCHIATRIC: NEGATIVE for changes in mood or affect    OBJECTIVE:   /70 (BP Location: Right arm, Patient Position: Sitting,  "Cuff Size: Adult Large)  Pulse 90  Temp 97.5  F (36.4  C) (Temporal)  Resp 16  Ht 5' 6.9\" (1.699 m)  Wt 284 lb 12.8 oz (129.2 kg)  SpO2 98%  BMI 44.74 kg/m2  EXAM:  GENERAL: healthy, alert and no distress  EYES: Eyes grossly normal to inspection, PERRL and conjunctivae and sclerae normal  HENT: ear canals and TM's normal, nose and mouth without ulcers or lesions.  No nasal congestion.  Oropharynx is pink and moist.  No tender with palpation to the sinuses.  NECK: no adenopathy, no asymmetry or scars and thyroid normal to palpation  RESP: lungs clear to auscultation - no rales, rhonchi or wheezes  CV: regular rate and rhythm, normal S1 S2, no S3 or S4, no murmur.  ABDOMEN: soft, no hepatosplenomegaly, no masses and bowel sounds normal.  No tender with palpation.  MS: no gross musculoskeletal defects noted, no edema.  Walk with no limping, normal gait.  All 4 extremities are equally in strength. Ankle, knees, hips, shoulders, elbows and wrists exams normal.  Normal fine motor skills on fingers.  Back is straight, no lordosis or scoliosis.  No tender with palpation.  SKIN: no suspicious lesions or rashes  NEURO: Normal strength and tone, mentation intact and speech normal. Cranial nerves II through XII are +2 throughout.  No focal neurological deficit.  No asterixis.  PSYCH: mentation appears normal, affect normal/bright  LYMPH: no cervical, supraclavicular or axillary adenopathy.   (male): Offered and recommended but he declined.  He has no concern.      ASSESSMENT/PLAN:   1. Encounter for routine adult health examination without abnormal findings  Overall healthy.  UTD for immunization.  Topic appropriate for his age discussed include safety issue and healthy diet.  Also discussed about substance abuse prevention, STD prevention.  Recommend aerobic exercise daily for at least 30 minutes.  Feels safe.  Follow in 1 year, earlier as needed.  Lab as ordered.      2. Attention deficit hyperactivity disorder " (ADHD), predominantly inattentive type  Stable and is doing well.  Continue with the Strattera which were prescribed by the psychiatrist.  No side effect from the Strattera.  Follow-up with the psychiatrist as scheduled which is about a month from today.    3. History of ETOH abuse  Currently inpatient rehab with recent history ofDUI.  Last drink was about a month ago.  No withdrawal symptoms or craving.  Will continue with the rehab and encouraged him to keep the good work.  Labs include a CMP today.    4. Morbid obesity (H)  Discussed with patient about the nature of the condition and its long term and short term effects.  Encourage him to continue with daily aerobic exercise and healthy diet.  Discussed about portioned diet as well.  Recommended him to set a goal of losing 2-4 # a month and work toward that with healthy life style modification.  Discussed with patient the goal for his weight and his BMI.  TSH level was checked today and it was normal.      5. Psychophysiological insomnia  Discussed with him about nature of the condition.  Good sleeping hygiene was dicussed.  Also discuss with him ways to reduce and adjust to stress.  Avoid high caffeinated beaverages after 3 pm.  Avoid any stimulant in the bedroom, include TV, radio, light or reading book.  Get up if not able to fall sleep in 30 minutes and go back to bed again when feel tire.  Avoid taking naps during the day complete.  Also recommend healthy diet and daily regular exercise.  Start him on trazodone 50 mg at bedtime and side effect discussed.  Call in if not improve or worsening.    - traZODone (DESYREL) 50 MG tablet; Take 1 tablet (50 mg) by mouth nightly as needed for sleep  Dispense: 30 tablet; Refill: 1    6. Screen for STD (sexually transmitted disease)  STD prevention and safe sex discussed.  - NEISSERIA GONORRHOEA PCR  - CHLAMYDIA TRACHOMATIS PCR    7. Bipolar affective disorder, remission status unspecified (H)  Not on any medication.   "Stable and is doing well.  Follow-up with the psychiatrist as scheduled..      COUNSELING:  Reviewed preventive health counseling, as reflected in patient instructions       Regular exercise       Healthy diet/nutrition       Alcohol Use       Family planning       Safe sex practices/STD prevention       Consider Hep C screening for patients born between 1945 and 1965       reports that he has been smoking Cigarettes.  He has a 8.50 pack-year smoking history. He has never used smokeless tobacco.  Tobacco Cessation Action Plan: Information offered: Patient not interested at this time  Estimated body mass index is 44.74 kg/(m^2) as calculated from the following:    Height as of this encounter: 5' 6.9\" (1.699 m).    Weight as of this encounter: 284 lb 12.8 oz (129.2 kg).   Weight management plan: Discussed healthy diet and exercise guidelines and patient will follow up in 12 months in clinic to re-evaluate.    Counseling Resources:  ATP IV Guidelines  Pooled Cohorts Equation Calculator  FRAX Risk Assessment  ICSI Preventive Guidelines  Dietary Guidelines for Americans, 2010  USDA's MyPlate  ASA Prophylaxis  Lung CA Screening    Anna Crawley Mai, MD  [facility]  "

## 2018-06-11 NOTE — MR AVS SNAPSHOT
After Visit Summary   6/11/2018    Jorge Madrid    MRN: [id number]           Patient Information     Date Of Birth          1985        Visit Information        Provider Department      6/11/2018 4:00 PM Anna Cortez MD [facility]        Today's Diagnoses     Encounter for routine adult health examination without abnormal findings    -  1    Attention deficit hyperactivity disorder (ADHD), predominantly inattentive type        History of ETOH abuse        Morbid obesity (H)        Psychophysiological insomnia        Screen for STD (sexually transmitted disease)          Care Instructions      Preventive Health Recommendations  Male Ages 26 - 39    Yearly exam:             See your health care provider every year in order to  o   Review health changes.   o   Discuss preventive care.    o   Review your medicines if your doctor has prescribed any.    You should be tested each year for STDs (sexually transmitted diseases), if you re at risk.     After age 35, talk to your provider about cholesterol testing. If you are at risk for heart disease, have your cholesterol tested at least every 5 years.     If you are at risk for diabetes, you should have a diabetes test (fasting glucose).  Shots: Get a flu shot each year. Get a tetanus shot every 10 years.     Nutrition:    Eat at least 5 servings of fruits and vegetables daily.     Eat whole-grain bread, whole-wheat pasta and brown rice instead of white grains and rice.     Talk to your provider about Calcium and Vitamin D.     Lifestyle    Exercise for at least 150 minutes a week (30 minutes a day, 5 days a week). This will help you control your weight and prevent disease.     Limit alcohol to one drink per day.     No smoking.     Wear sunscreen to prevent skin cancer.     See your dentist every six months for an exam and cleaning.             Follow-ups after your visit        Your next 10 appointments already scheduled     Jn  "2018  4:00 PM CDT   Adult Med Follow UP with Nerissa Hills MD   Psychiatry Clinic ([facility])    [place] [id number]  [address] [place] [id number] [phone]              Who to contact     If you have questions or need follow up information about today's clinic visit or your schedule please contact [facility] directly at [phone].  Normal or non-critical lab and imaging results will be communicated to you by [employer], letter or phone within 4 business days after the clinic has received the results. If you do not hear from us within 7 days, please contact the clinic through [employer] or phone. If you have a critical or abnormal lab result, we will notify you by phone as soon as possible.  Submit refill requests through [employer] or call your pharmacy and they will forward the refill request to us. Please allow 3 business days for your refill to be completed.          Additional Information About Your Visit        [employer] Information     [employer] lets you send messages to your doctor, view your test results, renew your prescriptions, schedule appointments and more. To sign up, go to [email or web address] . Click on \"Log in\" on the left side of the screen, which will take you to the Welcome page. Then click on \"Sign up Now\" on the right side of the page.     You will be asked to enter the access code listed below, as well as some personal information. Please follow the directions to create your username and password.     Your access code is: [email or web address]  Expires: 2018  5:40 PM     Your access code will  in 90 days. If you need help or a new code, please call your [facility] clinic or [phone].        Care EveryWhere ID     This is your Care EveryWhere ID. This could be used by other organizations to access your [facility] medical records  [id number]        Your Vitals Were     Pulse Temperature Respirations Height Pulse Oximetry " "BMI (Body Mass Index)    90 97.5  F (36.4  C) (Temporal) 16 5' 6.9\" (1.699 m) 98% 44.74 kg/m2       Blood Pressure from Last 3 Encounters:   06/11/18 116/70   01/12/18 136/74   01/18/17 (!) 151/97    Weight from Last 3 Encounters:   06/11/18 284 lb 12.8 oz (129.2 kg)   01/12/18 271 lb 1.6 oz (123 kg)   01/18/17 300 lb (136.1 kg)              We Performed the Following     CHLAMYDIA TRACHOMATIS PCR     NEISSERIA GONORRHOEA PCR          Today's Medication Changes          These changes are accurate as of 6/11/18  5:17 PM.  If you have any questions, ask your nurse or doctor.               Start taking these medicines.        Dose/Directions    traZODone 50 MG tablet   Commonly known as:  DESYREL   Used for:  Psychophysiological insomnia   Started by:  Anna Cortez MD        Dose:  50 mg   Take 1 tablet (50 mg) by mouth nightly as needed for sleep   Quantity:  30 tablet   Refills:  1         Stop taking these medicines if you haven't already. Please contact your care team if you have questions.     ADDERALL PO   Stopped by:  Anna Cortez MD                Where to get your medicines      These medications were sent to [facility] [place] [id number] [facility]   [address] [facility] , [facility] [address]     Phone:  [phone]     traZODone 50 MG tablet                Primary Care Provider Office Phone # Fax #    Yesi Danae Lama, APRN [facility] [phone] [address] [address] [place] [address]        Equal Access to Services     [facility] AH: Hadii jaycee suero Sodarrell, waaxda luqadaha, qaybta kaalmada nilda, david bush. So [facility] [place] [phone].    ATENCIÓN: Si habla español, tiene a winter disposición servicios gratuitos de asistencia lingüística. Llame al [phone].    We comply with applicable federal civil rights laws and [place] laws. We do not discriminate on the basis of race, color, national origin, age, disability, sex, sexual " orientation, or gender identity.            Thank you!     Thank you for choosing [facility]  for your care. Our goal is always to provide you with excellent care. Hearing back from our patients is one way we can continue to improve our services. Please take a few minutes to complete the written survey that you may receive in the mail after your visit with us. Thank you!             Your Updated Medication List - Protect others around you: Learn how to safely use, store and throw away your medicines at www.disposemymeds.org.          This list is accurate as of 6/11/18  5:17 PM.  Always use your most recent med list.                   Brand Name Dispense Instructions for use Diagnosis    atomoxetine 40 MG capsule    STRATTERA    30 capsule    Take 1 capsule (40 mg) by mouth daily    Attention deficit hyperactivity disorder (ADHD), unspecified ADHD type       traZODone 50 MG tablet    DESYREL    30 tablet    Take 1 tablet (50 mg) by mouth nightly as needed for sleep    Psychophysiological insomnia

## 2018-06-11 NOTE — NURSING NOTE
Chief Complaint   Patient presents with     Physical     There are no preventive care reminders to display for this patient.    Kiet Chavez, [facility]

## 2018-06-12 LAB
C TRACH DNA SPEC QL NAA+PROBE: NEGATIVE
N GONORRHOEA DNA SPEC QL NAA+PROBE: NEGATIVE
SPECIMEN SOURCE: NORMAL
SPECIMEN SOURCE: NORMAL

## 2018-06-13 ENCOUNTER — TELEPHONE (OUTPATIENT)
Dept: FAMILY MEDICINE | Facility: CLINIC | Age: 33
End: 2018-06-13

## 2018-06-13 PROBLEM — E66.01 MORBID OBESITY (H): Status: ACTIVE | Noted: 2018-06-13

## 2018-06-13 NOTE — TELEPHONE ENCOUNTER
The patient called back and information has been given.   Thank you,  Radhika Villa   for [facility]

## 2018-06-13 NOTE — TELEPHONE ENCOUNTER
Left message for patient to return call. Please give message below:    Notes Recorded by Anna Cortez MD on 6/12/2018 at 9:23 PM  Please let patient know that his test for gonorrhea and Chlamydia were negative.

## 2018-06-15 ENCOUNTER — TELEPHONE (OUTPATIENT)
Dept: PSYCHIATRY | Facility: CLINIC | Age: 33
End: 2018-06-15

## 2018-06-15 NOTE — TELEPHONE ENCOUNTER
----- Message from Carolina Rivera RN sent at 6/15/2018 11:05 AM CDT -----  Regarding: FW: Care Coordination  Contact: [phone]      ----- Message -----     From: Silviano Manzanares     Sent: 6/15/2018  10:54 AM       To: Liliana Pérez RN  Subject: Care Coordination                                Caller:  Self  Medication:  Atomoxetine   Pharmacy and location:  [address]    [phone] (Phone)  [phone] (Fax)  Symptoms: Headaches/Light sensitivity (Eye)  Symptom onset: 2wk   Pending appt: 6/26/18  Okay to leave VM: Yes    Thanks

## 2018-06-15 NOTE — TELEPHONE ENCOUNTER
Called the number provided in the message and left VM requesting clarification on what he needs - whether he needs a refill or is reporting side effects.    Will route to Dr. Hills for FYI.

## 2018-06-18 ENCOUNTER — CARE COORDINATION (OUTPATIENT)
Dept: PSYCHIATRY | Facility: CLINIC | Age: 33
End: 2018-06-18

## 2018-06-18 ENCOUNTER — TELEPHONE (OUTPATIENT)
Dept: FAMILY MEDICINE | Facility: CLINIC | Age: 33
End: 2018-06-18

## 2018-06-18 NOTE — PROGRESS NOTES
Appt history:  Last seen:  2/12/18 (Dr. Abernathy); 5/22/18 (Dr. Hills)  RTC:  TBD  Cancel:  3/21/18, 4/4/18 with Dr. Abernathy  No-show:  none  Next appt:  Not scheduled    At last appt 5/22/18:  Start Strattera 40 mg daily    Returned call to pt:  Currently taking Strattera 40 mg qam which he started right away after last appt.  After 1-1.5 weeks on medication began experiencing dull headaches.  Headaches start 45-60 min after he takes the medication in the morning and last for a few hours.  Severity of headaches vary.  Has tried Tylenol QD-BID with mixed results.  Eye movement and light make headaches worse.  Denies history of HA's or migraines prior to this.  Benefits include possible improvement in fidgeting.  Was also recently started on Trazodone for sleep but HA's began before starting this medication.  Currently at [facility] treatment program and believes he will be there for another week before returning home.  Will notify Dr. Abernathy and Jeana and seek recommendations.  Pt agreeable to returning to clinic for an appt but asks that this be scheduled after 7/1/18.  Pt confirms detailed msg can be left at [phone].

## 2018-06-18 NOTE — PROGRESS NOTES
Med Side effects  Received: Today       Olesya Mcdonald Katherine J, RN       Phone Number: [phone]                     Pt left [place] over the weekend, would like return call regarding side effects from his ADHD meds.      Side Effects  Received: Today       Ursula Red Katherine J, RN       Phone Number: [phone]                     Marylin Hortarick is calling to discuss some potential side effects he is experiencing with Strattera.  He would like a call back at [phone].  He will have the phone with him, but it is ok to leave a message if he doesn't answer.     Thank you,   Ursula MENDOZA

## 2018-06-18 NOTE — PROGRESS NOTES
Spoke with Dr. Abernathy who recommends:   -ok to stop Strattera   -pt will need to be seen to discuss possibility of starting an alternative medication  -pt has appt on 6/26 with Dr. Hills       Call to pt:   -no answer at number provided  -lvm with above information  -requested that he call clinic if unable to attend appt on 6/26

## 2018-06-19 NOTE — TELEPHONE ENCOUNTER
Patient called back and would like them faxed over to [employer]. Fax number is . Any other questions please call the patient back.       Thank you,  Kenzie Cherry   for [facility]

## 2018-06-19 NOTE — TELEPHONE ENCOUNTER
Forms are complete but can not fax patient needs to come in and sign the release of info on his form. Forms are in [employer] basket    Left message for patient to return call.  Delia Martin MA 6/19/2018

## 2018-06-20 NOTE — TELEPHONE ENCOUNTER
Spoke to [employer] there fax is confidential faxed the forms to patient.  Delia Martin MA 6/20/2018

## 2018-06-21 ENCOUNTER — OFFICE VISIT (OUTPATIENT)
Dept: URGENT CARE | Facility: RETAIL CLINIC | Age: 33
End: 2018-06-21
Payer: COMMERCIAL

## 2018-06-21 ENCOUNTER — TELEPHONE (OUTPATIENT)
Dept: PSYCHIATRY | Facility: CLINIC | Age: 33
End: 2018-06-21

## 2018-06-21 VITALS
SYSTOLIC BLOOD PRESSURE: 159 MMHG | TEMPERATURE: 98.6 F | DIASTOLIC BLOOD PRESSURE: 131 MMHG | HEART RATE: 83 BPM | OXYGEN SATURATION: 98 %

## 2018-06-21 DIAGNOSIS — K08.89 PAIN, DENTAL: ICD-10-CM

## 2018-06-21 DIAGNOSIS — K04.7 DENTAL INFECTION: Primary | ICD-10-CM

## 2018-06-21 PROCEDURE — 99202 OFFICE O/P NEW SF 15 MIN: CPT | Performed by: NURSE PRACTITIONER

## 2018-06-21 RX ORDER — CEPHALEXIN 500 MG/1
500 CAPSULE ORAL 3 TIMES DAILY
Qty: 30 CAPSULE | Refills: 0 | Status: SHIPPED | OUTPATIENT
Start: 2018-06-21 | End: 2018-07-01

## 2018-06-21 RX ORDER — IBUPROFEN 800 MG/1
800 TABLET, FILM COATED ORAL EVERY 8 HOURS PRN
Qty: 60 TABLET | Refills: 1 | Status: SHIPPED | OUTPATIENT
Start: 2018-06-21 | End: 2019-12-13

## 2018-06-21 NOTE — LETTER
June 21, 2018      RE: Jorge [facility]  [address] [facility] [address]         To Whom It May Concern,    Jorge should discontinue taking Strattera 40 mg daily as of 6/18/18.  Please contact the clinic with any questions.      Sincerely,        Carolina Abernathy, DO

## 2018-06-21 NOTE — MR AVS SNAPSHOT
"              After Visit Summary   2018    Jorge Madrid    MRN: [id number]           Patient Information     Date Of Birth          1985        Visit Information        Provider Department      2018 1:40 PM Rogelio Madrigal APRN CNP [facility]        Today's Diagnoses     Dental infection    -  1    Pain, dental           Follow-ups after your visit        Your next 10 appointments already scheduled     2018  4:00 PM CDT   Adult Med Follow UP with Nerissa Hills MD   Psychiatry Clinic ([facility])    [place] [id number]  [address] [address] [phone]              Who to contact     You can reach your care team any time of the day by calling [phone].  Notification of test results:  If you have an abnormal lab result, we will notify you by phone as soon as possible.         Additional Information About Your Visit        MyChart Information     [employer] lets you send messages to your doctor, view your test results, renew your prescriptions, schedule appointments and more. To sign up, go to [email or web address] . Click on \"Log in\" on the left side of the screen, which will take you to the Welcome page. Then click on \"Sign up Now\" on the right side of the page.     You will be asked to enter the access code listed below, as well as some personal information. Please follow the directions to create your username and password.     Your access code is: [email or web address]  Expires: 2018  5:40 PM     Your access code will  in 90 days. If you need help or a new code, please call your [facility] clinic or [phone].        Care EveryWhere ID     This is your Care EveryWhere ID. This could be used by other organizations to access your [facility] medical records  [id number]        Your Vitals Were     Pulse Temperature Pulse Oximetry             83 98.6  F (37  C) (Oral) 98%          Blood Pressure " from Last 3 Encounters:   06/21/18 (!) 159/131   06/11/18 116/70   01/12/18 136/74    Weight from Last 3 Encounters:   06/11/18 284 lb 12.8 oz (129.2 kg)   01/12/18 271 lb 1.6 oz (123 kg)   01/18/17 300 lb (136.1 kg)              Today, you had the following     No orders found for display         Today's Medication Changes          These changes are accurate as of 6/21/18  1:58 PM.  If you have any questions, ask your nurse or doctor.               Start taking these medicines.        Dose/Directions    cephALEXin 500 MG capsule   Commonly known as:  KEFLEX   Used for:  Pain, dental, Dental infection   Started by:  Rogelio Madrigal APRN CNP        Dose:  500 mg   Take 1 capsule (500 mg) by mouth 3 times daily for 10 days   Quantity:  30 capsule   Refills:  0       ibuprofen 800 MG tablet   Commonly known as:  ADVIL/MOTRIN   Used for:  Pain, dental   Started by:  Rogelio Madrigal APRN CNP        Dose:  800 mg   Take 1 tablet (800 mg) by mouth every 8 hours as needed for moderate pain   Quantity:  60 tablet   Refills:  1       lidocaine (viscous) 2 % solution   Commonly known as:  XYLOCAINE   Used for:  Pain, dental   Started by:  Rogelio Madrigal APRN CNP        5 to 15 ml, At back of throat slowly swish and swallow or spit every 3-8 hours as needed; max 8 doses/24 hour period   Quantity:  100 mL   Refills:  0            Where to get your medicines      These medications were sent to [address]  [address] [facility] [address]     Phone:  [phone]     cephALEXin 500 MG capsule    ibuprofen 800 MG tablet    lidocaine (viscous) 2 % solution                Primary Care Provider Office Phone # Fax #    Yesi COURTNEY Pascual [phone] [address] [address] [place] [address]        Equal Access to Services     [place] ARITA AH: Radha suero Soomaali, waaxda luqadaha, qaybta kaaldavid strickland  ah. So [facility] [phone].    ATENCIÓN: Si callie hale, tiene a winter disposición servicios gratuitos de asistencia lingüística. Elias al [phone].    We comply with applicable federal civil rights laws and [place] laws. We do not discriminate on the basis of race, color, national origin, age, disability, sex, sexual orientation, or gender identity.            Thank you!     Thank you for choosing [facility]  for your care. Our goal is always to provide you with excellent care. Hearing back from our patients is one way we can continue to improve our services. Please take a few minutes to complete the written survey that you may receive in the mail after your visit with us. Thank you!             Your Updated Medication List - Protect others around you: Learn how to safely use, store and throw away your medicines at www.disposemymeds.org.          This list is accurate as of 6/21/18  1:58 PM.  Always use your most recent med list.                   Brand Name Dispense Instructions for use Diagnosis    atomoxetine 40 MG capsule    STRATTERA    30 capsule    Take 1 capsule (40 mg) by mouth daily    Attention deficit hyperactivity disorder (ADHD), unspecified ADHD type       cephALEXin 500 MG capsule    KEFLEX    30 capsule    Take 1 capsule (500 mg) by mouth 3 times daily for 10 days    Pain, dental, Dental infection       ibuprofen 800 MG tablet    ADVIL/MOTRIN    60 tablet    Take 1 tablet (800 mg) by mouth every 8 hours as needed for moderate pain    Pain, dental       lidocaine (viscous) 2 % solution    XYLOCAINE    100 mL    5 to 15 ml, At back of throat slowly swish and swallow or spit every 3-8 hours as needed; max 8 doses/24 hour period    Pain, dental       traZODone 50 MG tablet    DESYREL    30 tablet    Take 1 tablet (50 mg) by mouth nightly as needed for sleep    Psychophysiological insomnia

## 2018-06-21 NOTE — PROGRESS NOTES
SUBJECTIVE:  Jorge Madrid is a 32 year old male here today with tooth pain lower left.   Has been going on for 1 day worsening suddenly.   Relieving Factors:  Nothing   Worse with: Hot and Cold, touching.  Symptoms have been sudden worsening since that time.  Prior history of related problems: Yes some    Past Medical, social, family histories, medications, and allergies reviewed and updated  Current Outpatient Prescriptions   Medication     traZODone (DESYREL) 50 MG tablet     atomoxetine (STRATTERA) 40 MG capsule     No current facility-administered medications for this visit.        OBJECTIVE:   Vitals:    06/21/18 1339   BP: (!) 159/131   Pulse: 83   Temp: 98.6  F (37  C)   TempSrc: Oral   SpO2: 98%     Eye exam is normal - JOE, EOMI, corneas normal.  EARS: canals clear, TM retracted not injected .  Oropharyngeal exam - Fair hygeine. A few teeth missing and several with filling. Red swollen tender area with infection.  The neck is supple and free of adenopathy or masses, the thyroid is normal without enlargement or nodules.    ASSESSMENT:   Dental infection  Pain, dental      PLAN:  Current Outpatient Prescriptions   Medication     cephALEXin (KEFLEX) 500 MG capsule     ibuprofen (ADVIL/MOTRIN) 800 MG tablet     lidocaine, viscous, (XYLOCAINE) 2 % solution     traZODone (DESYREL) 50 MG tablet     atomoxetine (STRATTERA) 40 MG capsule     No current facility-administered medications for this visit.      Blood Pressure reviewed & discussed, Jorge Madrid advised to F/U with PCP.  Improve oral hygeine  Follow up with dentist  Tylenol 1-2 tabs po q4h prn / motrin prn    Rogelio Madrigal MSN, APRN, Family NP-C  [facility]

## 2018-06-26 ENCOUNTER — OFFICE VISIT (OUTPATIENT)
Dept: PSYCHIATRY | Facility: CLINIC | Age: 33
End: 2018-06-26
Attending: PSYCHIATRY & NEUROLOGY
Payer: COMMERCIAL

## 2018-06-26 VITALS
HEART RATE: 89 BPM | SYSTOLIC BLOOD PRESSURE: 113 MMHG | DIASTOLIC BLOOD PRESSURE: 72 MMHG | BODY MASS INDEX: 46.03 KG/M2 | WEIGHT: 293 LBS

## 2018-06-26 DIAGNOSIS — F10.20 ALCOHOL USE DISORDER, SEVERE, DEPENDENCE (H): Primary | ICD-10-CM

## 2018-06-26 PROCEDURE — G0463 HOSPITAL OUTPT CLINIC VISIT: HCPCS | Mod: ZF

## 2018-06-26 RX ORDER — NALTREXONE HYDROCHLORIDE 50 MG/1
50 TABLET, FILM COATED ORAL DAILY
Qty: 30 TABLET | Refills: 1 | Status: SHIPPED | OUTPATIENT
Start: 2018-06-26 | End: 2019-12-13

## 2018-06-26 ASSESSMENT — PAIN SCALES - GENERAL: PAINLEVEL: NO PAIN (0)

## 2018-06-26 NOTE — NURSING NOTE
Chief Complaint   Patient presents with     Recheck Medication     Attention deficit hyperactivity disorder (ADHD), combined type
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
## 2018-06-26 NOTE — MR AVS SNAPSHOT
After Visit Summary   6/26/2018    Jorge Madrid    MRN: [id number]           Patient Information     Date Of Birth          1985        Visit Information        Provider Department      6/26/2018 4:00 PM Nerissa Hills MD Psychiatry Clinic        Today's Diagnoses     Alcohol use disorder, severe, dependence (H)    -  1       Follow-ups after your visit        Follow-up notes from your care team     Return in about 1 month (around 7/26/2018).      Who to contact     Please call your clinic at [phone] to:    Ask questions about your health    Make or cancel appointments    Discuss your medicines    Learn about your test results    Speak to your doctor            Additional Information About Your Visit        Care EveryWhere ID     This is your Care EveryWhere ID. This could be used by other organizations to access your [facility] medical records  [id number]        Your Vitals Were     Pulse BMI (Body Mass Index)                89 46.03 kg/m2           Blood Pressure from Last 3 Encounters:   06/26/18 113/72   06/21/18 (!) 159/131   06/11/18 116/70    Weight from Last 3 Encounters:   06/26/18 132.9 kg (293 lb)   06/11/18 129.2 kg (284 lb 12.8 oz)   05/22/18 123.4 kg (272 lb)                 Today's Medication Changes          These changes are accurate as of 6/26/18 11:59 PM.  If you have any questions, ask your nurse or doctor.               Start taking these medicines.        Dose/Directions    naltrexone 50 MG tablet   Commonly known as:  DEPADE;REVIA   Used for:  Alcohol use disorder, severe, dependence (H)   Started by:  Nerissa Hills MD        Dose:  50 mg   Take 1 tablet (50 mg) by mouth daily   Quantity:  30 tablet   Refills:  1            Where to get your medicines      These medications were sent to [facility] [place]  [place], [place] - [address]  [address], [place] [address]     Phone:  [phone]     naltrexone 50 MG tablet                 Primary Care Provider Office Phone # Fax #    COURTNEY Mims [facility] [phone] [address] [address] [place] [address]        Equal Access to Services     [place] ARITA : Hadii jaycee ku angelo Soyoonali, waaxda luqadaha, qaybta kaalmada adeegyada, waxla idiin hayaan lennie wang lapretty bush. So [facility] [phone].    ATENCIÓN: Si habla español, tiene a winter disposición servicios gratuitos de asistencia lingüística. Llame al [phone].    We comply with applicable federal civil rights laws and [place] laws. We do not discriminate on the basis of race, color, national origin, age, disability, sex, sexual orientation, or gender identity.            Thank you!     Thank you for choosing PS[facility]ATRY CLINIC  for your care. Our goal is always to provide you with excellent care. Hearing back from our patients is one way we can continue to improve our services. Please take a few minutes to complete the written survey that you may receive in the mail after your visit with us. Thank you!             Your Updated Medication List - Protect others around you: Learn how to safely use, store and throw away your medicines at www.disposemymeds.org.          This list is accurate as of 6/26/18 11:59 PM.  Always use your most recent med list.                   Brand Name Dispense Instructions for use Diagnosis    atomoxetine 40 MG capsule    STRATTERA    30 capsule    Take 1 capsule (40 mg) by mouth daily    Attention deficit hyperactivity disorder (ADHD), unspecified ADHD type       cephALEXin 500 MG capsule    KEFLEX    30 capsule    Take 1 capsule (500 mg) by mouth 3 times daily for 10 days    Pain, dental, Dental infection       ibuprofen 800 MG tablet    ADVIL/MOTRIN    60 tablet    Take 1 tablet (800 mg) by mouth every 8 hours as needed for moderate pain    Pain, dental       lidocaine (viscous) 2 % solution    XYLOCAINE    100 mL    5 to 15 ml, At back of throat slowly swish and swallow or spit every  3-8 hours as needed; max 8 doses/24 hour period    Pain, dental       naltrexone 50 MG tablet    DEPADE;REVIA    30 tablet    Take 1 tablet (50 mg) by mouth daily    Alcohol use disorder, severe, dependence (H)       traZODone 50 MG tablet    DESYREL    30 tablet    Take 1 tablet (50 mg) by mouth nightly as needed for sleep    Psychophysiological insomnia

## 2019-04-04 ENCOUNTER — OFFICE VISIT (OUTPATIENT)
Dept: URGENT CARE | Facility: RETAIL CLINIC | Age: 34
End: 2019-04-04
Payer: COMMERCIAL

## 2019-04-04 VITALS
OXYGEN SATURATION: 99 % | HEART RATE: 96 BPM | SYSTOLIC BLOOD PRESSURE: 110 MMHG | TEMPERATURE: 97.6 F | DIASTOLIC BLOOD PRESSURE: 68 MMHG

## 2019-04-04 DIAGNOSIS — J35.8 CRYPTIC TONSIL: ICD-10-CM

## 2019-04-04 DIAGNOSIS — J02.9 ACUTE PHARYNGITIS, UNSPECIFIED ETIOLOGY: Primary | ICD-10-CM

## 2019-04-04 LAB — S PYO AG THROAT QL IA.RAPID: NORMAL

## 2019-04-04 PROCEDURE — 87081 CULTURE SCREEN ONLY: CPT | Performed by: FAMILY MEDICINE

## 2019-04-04 PROCEDURE — 99213 OFFICE O/P EST LOW 20 MIN: CPT | Performed by: FAMILY MEDICINE

## 2019-04-04 PROCEDURE — 87880 STREP A ASSAY W/OPTIC: CPT | Mod: QW | Performed by: FAMILY MEDICINE

## 2019-04-04 NOTE — PROGRESS NOTES
SUBJECTIVE:  Jorge Madrid is a 33 year old male with a chief complaint of sore throat.  Onset of symptoms was 2 day(s) ago.    Course of illness: still present.  Severity moderate  Current and Associated symptoms: sore throat and fatigue  Treatment measures tried include None tried.  Predisposing factors include None.    Past Medical History:   Diagnosis Date     ADHD (attention deficit hyperactivity disorder) 2004     Bipolar 2 disorder (H) 2004     Deviated nasal septum      Obesity      Tobacco use disorder      Current Outpatient Medications   Medication Sig Dispense Refill     atomoxetine (STRATTERA) 40 MG capsule Take 1 capsule (40 mg) by mouth daily (Patient not taking: Reported on 6/21/2018) 30 capsule 0     ibuprofen (ADVIL/MOTRIN) 800 MG tablet Take 1 tablet (800 mg) by mouth every 8 hours as needed for moderate pain 60 tablet 1     naltrexone (DEPADE;REVIA) 50 MG tablet Take 1 tablet (50 mg) by mouth daily 30 tablet 1     History   Smoking Status     Current Every Day Smoker     Packs/day: 0.50     Years: 17.00     Types: Cigarettes   Smokeless Tobacco     Never Used     Comment: started age 15       ROS:  Review of systems negative except as stated above.    OBJECTIVE:   /68   Pulse 96   Temp 97.6  F (36.4  C) (Oral)   SpO2 99%   GENERAL APPEARANCE: mild distress and cooperative  EYES: EOMI,  PERRL, conjunctiva clear  HENT: Tonsils are cryptic with moderate food imbedded.  Lt larger than rt.  NECK: supple, non-tender to palpation, no adenopathy noted  RESP: lungs clear to auscultation - no rales, rhonchi or wheezes  CV: regular rates and rhythm, normal S1 S2, no murmur noted  ABDOMEN:  soft, nontender, no HSM or masses and bowel sounds normal  SKIN: no suspicious lesions or rashes    Rapid Strep test is negative; await throat culture results.    ASSESSMENT:  Acute pharyngitis, unspecified etiology  Cryptic tonsils  PLAN: See ENT if continued problems.  Symptomatic treat with gargles,  lozenges, and OTC analgesic as needed.   Follow-up with primary care provider if not improving.

## 2019-04-06 LAB
BACTERIA SPEC CULT: NORMAL
SPECIMEN SOURCE: NORMAL

## 2019-12-13 ENCOUNTER — OFFICE VISIT (OUTPATIENT)
Dept: FAMILY MEDICINE | Facility: OTHER | Age: 34
End: 2019-12-13
Payer: COMMERCIAL

## 2019-12-13 VITALS
WEIGHT: 259 LBS | HEART RATE: 98 BPM | SYSTOLIC BLOOD PRESSURE: 120 MMHG | BODY MASS INDEX: 40.69 KG/M2 | RESPIRATION RATE: 16 BRPM | DIASTOLIC BLOOD PRESSURE: 78 MMHG | TEMPERATURE: 97.9 F | OXYGEN SATURATION: 98 %

## 2019-12-13 DIAGNOSIS — F12.11 H/O CANNABIS ABUSE: ICD-10-CM

## 2019-12-13 DIAGNOSIS — F10.11 HISTORY OF ETOH ABUSE: ICD-10-CM

## 2019-12-13 DIAGNOSIS — F31.81 BIPOLAR 2 DISORDER (H): ICD-10-CM

## 2019-12-13 DIAGNOSIS — F90.0 ATTENTION DEFICIT HYPERACTIVITY DISORDER (ADHD), PREDOMINANTLY INATTENTIVE TYPE: Primary | ICD-10-CM

## 2019-12-13 PROCEDURE — 99213 OFFICE O/P EST LOW 20 MIN: CPT | Performed by: NURSE PRACTITIONER

## 2019-12-13 RX ORDER — DEXTROAMPHETAMINE SACCHARATE, AMPHETAMINE ASPARTATE MONOHYDRATE, DEXTROAMPHETAMINE SULFATE AND AMPHETAMINE SULFATE 5; 5; 5; 5 MG/1; MG/1; MG/1; MG/1
CAPSULE, EXTENDED RELEASE ORAL
Refills: 0 | COMMUNITY
Start: 2019-09-11 | End: 2024-03-05

## 2019-12-13 NOTE — PROGRESS NOTES
Subjective     Jorge Madrid is a 34 year old male who presents to clinic today for the following health issues:    HPI     SUBJECTIVE:  Patient is here today to recheck ADHD/ADD.    Updates since last visit: has been out for a few days since moving up here   Routine for taking medicine, including time: 6:30 - 7:30 am   Time medicine wears off: afternoon/dinner time   Issues at school/Work: sometimes work in the afternoon is hard   Issues at home: none   Control of symptoms: well controlled     Side effects:  Headaches: No  Stomach aches: No  Irritability/mood swings: No  Difficulties with sleep: No  Social withdrawal: No  Unusual movements/tics: No  Decreased appetite: No    Other concerns: needs mental health referrals to continue adderall     Patient states he was being seen by a provider Wei Lopez at [facility] in [place] for treatment of ADHD. His insurance is no longer is accepted there. Prior to this he was being seen by another provider she left the practice and he was not able to obtain these records. He was then established by Dr. Lopez.     He has history of bipolar 2 but does not feel this is a true diagnosis. He is looking to establish with another psychiatrist through [facility].     History of etoh and cannabis he states he has not used cannabis in a long time and last used etoh about 4 weeks. He was in [facility] was released in June 2020  He is in treatment through [facility] in [place].     Patient Active Problem List   Diagnosis     Bipolar disorder (H)     Tobacco use disorder     Acquired deviated nasal septum     ADHD (attention deficit hyperactivity disorder)     Bipolar 2 disorder (H)     H/O cannabis abuse     History of ETOH abuse     Morbid obesity (H)     Tobacco dependence     Past Surgical History:   Procedure Laterality Date     SURGICAL HISTORY OF -   1997    Meatoplasty with urethral dilatation.       Social History     Tobacco Use     Smoking  status: Current Every Day Smoker     Packs/day: 0.50     Years: 17.00     Pack years: 8.50     Types: Cigarettes     Smokeless tobacco: Never Used     Tobacco comment: started age 15   Substance Use Topics     Alcohol use: No     Comment: last use  5/4/18     Family History   Problem Relation Age of Onset     No Known Problems Mother      No Known Problems Father      Diabetes Maternal Grandmother      Cerebrovascular Disease Maternal Grandmother      No Known Problems Maternal Grandfather      No Known Problems Paternal Grandmother      Heart Disease Paternal Grandfather          Current Outpatient Medications   Medication Sig Dispense Refill     amphetamine-dextroamphetamine (ADDERALL XR) 20 MG 24 hr capsule TK ONE C PO QAM  0     Allergies   Allergen Reactions     No Known Drug Allergies      No lab results found.   BP Readings from Last 3 Encounters:   12/13/19 120/78   04/04/19 110/68   06/26/18 113/72    Wt Readings from Last 3 Encounters:   12/13/19 117.5 kg (259 lb)   06/26/18 132.9 kg (293 lb)   06/11/18 129.2 kg (284 lb 12.8 oz)         Reviewed and updated as needed this visit by Provider  Meds         Review of Systems   ROS COMP: Constitutional, HEENT, cardiovascular, pulmonary, GI, , musculoskeletal, neuro, skin, endocrine and psych systems are negative, except as otherwise noted.      Objective    /78   Pulse 98   Temp 97.9  F (36.6  C) (Temporal)   Resp 16   Wt 117.5 kg (259 lb)   SpO2 98%   BMI 40.69 kg/m    Body mass index is 40.69 kg/m .  Physical Exam   GENERAL: healthy, alert and no distress  EYES: Eyes grossly normal to inspection, PERRL and conjunctivae and sclerae normal  NECK: no adenopathy, no asymmetry, masses, or scars and thyroid normal to palpation  RESP: lungs clear to auscultation - no rales, rhonchi or wheezes  CV: regular rate and rhythm, normal S1 S2, no S3 or S4, no murmur, click or rub, no peripheral edema and peripheral pulses strong  MS: no gross musculoskeletal  defects noted, no edema  PSYCH: mentation appears normal, affect normal/bright    Assessment & Plan     1. Attention deficit hyperactivity disorder (ADHD), predominantly inattentive type    - MENTAL HEALTH REFERRAL  - Adult; Psychiatry and Medication Management; Psychiatry; [facility]: [facility] Psychiatry Service [phone].  Medication management & future refills will be returned to [facility] PCP upon completion of evaluation; We gretel...    2. Bipolar 2 disorder (H)    - MENTAL HEALTH REFERRAL  - Adult; Psychiatry and Medication Management; Psychiatry; [facility]: [facility] Psychiatry Service [phone].  Medication management & future refills will be returned to [facility] PCP upon completion of evaluation; We gretel...    3. H/O cannabis abuse    - MENTAL HEALTH REFERRAL  - Adult; Psychiatry and Medication Management; Psychiatry; [facility]: [facility] Psychiatry Service [phone].  Medication management & future refills will be returned to [facility] PCP upon completion of evaluation; We gretel...    4. History of ETOH abuse    - MENTAL HEALTH REFERRAL  - Adult; Psychiatry and Medication Management; Psychiatry; [facility]: [facility] Psychiatry Service [phone].  Medication management & future refills will be returned to [facility] PCP upon completion of evaluation; We gretel...     1-4 patient will follow up with psychiatry.     Tobacco Cessation:   reports that he has been smoking cigarettes. He has a 8.50 pack-year smoking history. He has never used smokeless tobacco.  Tobacco Cessation Action Plan: Information offered: Patient not interested at this time        COURTNEY [facility]

## 2019-12-17 ENCOUNTER — OFFICE VISIT (OUTPATIENT)
Dept: PSYCHIATRY | Facility: CLINIC | Age: 34
End: 2019-12-17
Payer: COMMERCIAL

## 2019-12-17 VITALS
RESPIRATION RATE: 16 BRPM | WEIGHT: 265.4 LBS | TEMPERATURE: 98.3 F | HEART RATE: 95 BPM | DIASTOLIC BLOOD PRESSURE: 72 MMHG | BODY MASS INDEX: 41.69 KG/M2 | OXYGEN SATURATION: 98 % | SYSTOLIC BLOOD PRESSURE: 102 MMHG

## 2019-12-17 DIAGNOSIS — F31.81 BIPOLAR 2 DISORDER (H): ICD-10-CM

## 2019-12-17 DIAGNOSIS — F90.0 ATTENTION DEFICIT HYPERACTIVITY DISORDER (ADHD), PREDOMINANTLY INATTENTIVE TYPE: Primary | ICD-10-CM

## 2019-12-17 DIAGNOSIS — F32.A DEPRESSION, UNSPECIFIED DEPRESSION TYPE: Primary | ICD-10-CM

## 2019-12-17 DIAGNOSIS — F12.11 H/O CANNABIS ABUSE: ICD-10-CM

## 2019-12-17 DIAGNOSIS — F10.11 HISTORY OF ETOH ABUSE: ICD-10-CM

## 2019-12-17 PROCEDURE — 90792 PSYCH DIAG EVAL W/MED SRVCS: CPT | Performed by: NURSE PRACTITIONER

## 2019-12-17 ASSESSMENT — ANXIETY QUESTIONNAIRES
5. BEING SO RESTLESS THAT IT IS HARD TO SIT STILL: SEVERAL DAYS
3. WORRYING TOO MUCH ABOUT DIFFERENT THINGS: SEVERAL DAYS
1. FEELING NERVOUS, ANXIOUS, OR ON EDGE: SEVERAL DAYS
IF YOU CHECKED OFF ANY PROBLEMS ON THIS QUESTIONNAIRE, HOW DIFFICULT HAVE THESE PROBLEMS MADE IT FOR YOU TO DO YOUR WORK, TAKE CARE OF THINGS AT HOME, OR GET ALONG WITH OTHER PEOPLE: SOMEWHAT DIFFICULT
6. BECOMING EASILY ANNOYED OR IRRITABLE: NOT AT ALL
2. NOT BEING ABLE TO STOP OR CONTROL WORRYING: SEVERAL DAYS
7. FEELING AFRAID AS IF SOMETHING AWFUL MIGHT HAPPEN: NOT AT ALL
GAD7 TOTAL SCORE: 5

## 2019-12-17 ASSESSMENT — PAIN SCALES - GENERAL: PAINLEVEL: NO PAIN (0)

## 2019-12-17 ASSESSMENT — PATIENT HEALTH QUESTIONNAIRE - PHQ9
5. POOR APPETITE OR OVEREATING: SEVERAL DAYS
SUM OF ALL RESPONSES TO PHQ QUESTIONS 1-9: 9

## 2019-12-17 NOTE — PROGRESS NOTES
"                                                         Outpatient Psychiatric Evaluation - Standard Adult    Name:  Jorge Madrid  : 1985    Source of Referral:  Primary Care Provider: Daniela Verma APRN CNP    Last visit:  2019  Current Psychotherapist: None currently - is looking to see someone    Identifying Data:  Patient is a 34 year old,   White  male  who presents for initial visit with me.  Patient is currently unemployed. Patient attended the session alone. Consent to communicate signed for no-one. Consent for treatment signed and included in electronic medical record. Discussed limits of confidentiality today. My Practice Policy was reviewed and signed.     Patient prefers to be called: Jorge     Chief Complaint:  \"medication management\"    HPI:  Patient was referred for treatment of ADHD.    He reports he was diagnosed with ADHD and Bipolar disorder at age 21,  at [facility] in [place].  He was treated by Dr. Harrington, taking Adderall 60-80mg and Depakote 1000mg for many years.  He states he stopped the Depakote and Adderall when he went to halfway. He states \"they had their own opinion of my mental health\", which he elaborates on by stating \"its not bipolar, its just ADHD.\"  States he was in halfway for a year. He was released in , had some mental health services in a variety of locations since that time.  \"I tried to get services in [place] but that didn't work out very well\", when I attempted to obtain further details he states \"I don't remember.\"   Dr. Harrington has since retired.  He is currently not on any medications.   He tried lithium- reports it made him feel \"in a fog\", Seroquel- \"caused  sleep eating and I felt groggy\", and trazodone \"worked okay but didn't need it.  He reported that he felt Depakote and Adderall were going well and was able to keep a job.  Reports that he has always been on Depakote and Adderall " "together.  He also reportedly tried bupropion but felt it wasn't helpful and made his mind \"groggy\".  He has also had a trial of Strattera, vyvanse and concerta which he notes were not helpful.    In regards to ADHD, he was quite vague and defensive.  He states he had some difficulties with attention and fucus during school \"a little bit\", but was unable to elaborate on this other than stating he had \"difficulty completing anything\" and \"life was a big messy room.\" As an adult he states at times he did not show up to work.  Review of records notes patient has reported the following symptoms during previous appointments:  difficulty sustaining attention, has difficulty organizing tasks and activities, loses things, interrupts others, and does not follow through on instructions. ADHD testing records were not available today. During the assessment process he becomes agitated and quite restless, he states \"look I need to get out of here, I've telling my story for the last year.\"  He was able to calm and apologized however, remained restless, defensive and vague throughout interview.    Regarding depression, states he has had this \"my whole life.\"   States he was prescribed Prozac in the past but \"never picked it up.\"  Again he was quite vague and unwilling to elaborate on this.    He has an extensive substance use history.  He states he is currently in outpatient CD treatment.  He jokingly stats he will likely be in this program \"for the rests of my life.\"  He reports this treatment is currently court ordered.  He started inpatient CD treatment at [facility] the beginning of the summer.  He got out end of June and has been in out patient since that time. He initially tells me he last used marijuana and methamphetamine \"a long time ago.\"  However, he had reported to the MA during the rooming process that he had a recent relapse.  When I asked him about this he states \"yes, no, I don't know.\"     Psychiatric " "Review of Symptoms:  Depression: Interest: Decrease  Depressed Mood  Sleep: Increase   Energy: Decrease  Appetite: Increase   Concentration: Decrease   PHQ-9 scores:   PHQ-9 SCORE 2/12/2018 5/22/2018 12/17/2019   PHQ-9 Total Score - - -   PHQ-9 Total Score MyChart - - -   PHQ-9 Total Score 12 6 9     Claudia:  Distractibility: Increase  Impulsiveness: Increase  Irritability   MDQ Score: Negative Screen  Anxiety: Feeling nervous, anxious, or on edge  Uncontrolled worrying  Worrying too much about different things  Trouble relaxing  Restlessness    CECIL-7 scores:    CECIL-7 SCORE 1/12/2018 12/17/2019   Total Score 6 (mild anxiety) -   Total Score 6 5     Panic:  No symptoms   Agoraphobia:  No   OCD:  No symptoms   Psychosis: No symptoms   ADD / ADHD: Attention Problem(s)  Task Completion Difficulties  Poor Organization  Distractible  Forgetful  Eating Disorder:  No symptoms    Psychiatric History:     Hospitalizations: None   History of Commitment? No   Past Treatment: counseling, day treatment, medication(s) from physician / PCP and psychiatry  Suicide Attempts: No   Self-injurious Behavior: Denies  Electroconvulsive Therapy (ECT) or Transcranial Magnetic Stimulation (TMS): No   Genetic Testing: No     Substance Use History:  Social History     Tobacco Use     Smoking status: Current Every Day Smoker     Packs/day: 0.50     Years: 17.00     Pack years: 8.50     Types: Cigarettes     Smokeless tobacco: Never Used     Tobacco comment: started age 15   Substance Use Topics     Alcohol use: No     Comment: last use  5/4/18      Current/History use of drugs: Alcohol , Cannabis , Cocaine , Prescription drugs: Opiates and Methamphetamine.  Began using marijuana  in high school and then used it \"off and on\" since that.  Additionally he used Oxycodone in 2009 and methamphetamine.  The patient began drinking in high school.  He drank at parties and typically would have a 12 pack shared with others.  At the age of 20 he received his " first DUI.  His pattern did not change after the DUI.  At age 27 he obtained his second DUI. Between the DUIs he had outpatient treatments.  After his second DUI (2012) he was ordered to treatment which he did at [facility].  He got into trouble after this when he was on probation and apparently did not tell his  of his move to [place]. As a result, he was sent to penitentiary for a probation violation. Patient relapsed on alcohol a few weeks ago.    Patient reported the following problems as a result of drinking and drug use: DUI, family problems, financial problems, legal issues and relationship problems.   Based on the clinical interview, there  are indications of drug or alcohol abuse. as noted.  Caffeine:  Yes  2 cups/day of coffee  Patient has received chemical dependency treatment in the past at [facility]  Recovery Programming Involvement: Narcotics Anonymous He was in [facility] was released in June 2020  He is in treatment through [facility] in [place].     Past Medical History:  Past Medical History:   Diagnosis Date     ADHD (attention deficit hyperactivity disorder) 2004     Bipolar 2 disorder (H) 2004     Deviated nasal septum      Obesity      Tobacco use disorder       Surgery:   Past Surgical History:   Procedure Laterality Date     SURGICAL HISTORY OF -   1997    Meatoplasty with urethral dilatation.     Allergies:     Allergies   Allergen Reactions     No Known Drug Allergies      Primary Care Provider: COURTNEY Pickering CNP  Seizures or Head Injury: No  Diet: No Restrictions  Food Allergies: No   Exercise: No  Supplements: Reviewed per Electronic Medical Record Today    amphetamine-dextroamphetamine (ADDERALL XR) 20 MG 24 hr capsule, TK ONE C PO QAM    No current facility-administered medications on file prior to visit.      The [place] Prescription Monitoring Program has been reviewed and there are no  concerns about diversionary activity for controlled substances at this time.      Vital Signs:  Vitals: /72   Pulse 95   Temp 98.3  F (36.8  C) (Temporal)   Resp 16   Wt 120.4 kg (265 lb 6.4 oz)   SpO2 98%   BMI 41.69 kg/m      Labs:  TSH   Date Value Ref Range Status   12/15/2009 2.35 0.4 - 5.0 mU/L Final     Most recent labs reviewed and no new labs.     Review of Systems:  10 systems (general, cardiovascular, respiratory, eyes, ENT, endocrine, GI, , M/S, neurological) were reviewed. Denies pain, shortness of breath, dizziness. The remaining systems are all unremarkable.  Family History:   Patient reported family history includes:   Family History   Problem Relation Age of Onset     No Known Problems Mother      No Known Problems Father      Diabetes Maternal Grandmother      Cerebrovascular Disease Maternal Grandmother      No Known Problems Maternal Grandfather      No Known Problems Paternal Grandmother      Heart Disease Paternal Grandfather      Mental Illness History: Sister - adhd - otherwise unknown  Substance Abuse History: Denies  Suicide History: Denies  Medications: Yes: Sister     Social History:   Birth place: [facility]  Childhood: Patient grew up in [place], [place] family was intact throughout.   Siblings: 1 sister and 1 brother - patient is the oldest  Highest education level was high school graduate - Patient attended [employer] for 1 year- did not graduate.   Employment History:  Unemployed   Childhood illnesses: Denies  Current Living situation:  [place], [place] with Mother and Father . Feels safe at home.  Children: one   Firearms/Weapons Access: No: Patient denies   Service: No    Mental Status Examination:     Appearance:  awake, alert, adequately groomed and appeared stated age  Attitude:  evasive   Eye Contact:  fair  Gait and Station: Normal  Psychomotor Behavior:  no evidence of tardive dyskinesia, dystonia, or tics and intact station, gait and muscle  "tone  Oriented to:  time, person, and place  Attention Span and Concentration:  Normal  Speech:  clear, coherent, regular rate and regular rhythm  Mood:  \"fine\"  Affect:  agitated  Associations:  no loose associations  Thought Process:  logical, linear and goal oriented  Thought Content:  no evidence of suicidal ideation or homicidal ideation and no evidence of psychotic thought  Recent and Remote Memory:  intact Not formally assessed. No amnesia.  Fund of Knowledge: appropriate  Insight:  limited  Judgment:  limited  Impulse Control:  limited    Suicide Risk Assessment:  Today Jorge Madrid denies suicidal thoughts or urges to harm self. Based on all available evidence, Jorge Madrid does not appear to be at imminent risk for self-harm, does not meet criteria for a 72-hr hold, and therefore remains appropriate for ongoing outpatient level of care.  A thorough assessment of risk factors related to suicide and self-harm have been reviewed and are noted above. The patient convincingly denies acute suicidality on several occasions. Local community safety resources reviewed and printed for patient to use if needed. There was no deceit detected, and the patient presented in a manner that was believable.     DSM5  Diagnosis:  311 (F32.9) Unspecified Depressive Disorder    Stimulant Use disorder  Alcohol Use disorder  Opioid use disorder  Cannabis use disorder    Medical Comorbidities Include:   Patient Active Problem List    Diagnosis Date Noted     Morbid obesity (H) 06/13/2018     Priority: Medium     H/O cannabis abuse 01/12/2018     Priority: Medium     History of ETOH abuse 01/12/2018     Priority: Medium     Tobacco dependence 11/10/2011     Priority: Medium     ADHD (attention deficit hyperactivity disorder)      Priority: Medium     Overview:   Managed in , Adderall 80 mg daily       Bipolar 2 disorder (H)      Priority: Medium     Tobacco use disorder 12/14/2007     Priority: Medium     Bipolar " "disorder (H) 05/30/2007     Priority: Medium     Problem list name updated by automated process. Provider to review         A 12-item WHODAS 2.0 assessment was completed by the patient today and recorded in EPIC.    WHODAS 2.0 Total Score 12/17/2019   Total Score 28       The Patient Activation Measure (JENNIE) score was completed and recorded in [facility]. This assesses patient knowledge, skill, and confidence for self-management. No flowsheet data found.     Impression:  Jorge Madrid is a 34 year old male with an extensive substance use history.  He is currently participating in outpatient chemical dependency treatment following an inpatient CD treatment admission. He has had several previous CD treatment admission in the past as well. He is currently endorsing attentional difficulties although he is quite defensive and evasive making it very difficult to complete a thorough assessment.  He is requesting to restart Adderall which he had taken in the past under the care of a provider at [facility] in [place]. He also endorses some depressive symptoms although does not believe he is depressed.  He reports a past diagnosis of Bipolar disorder, although his lack of cooperation and use of substances makes it difficult to clarify this. Given his significant substance use history along with the fact that I do not have any records today regarding ADHD testing, I am not comfortable starting Adderall.  I did offer him Wellbutrin or Strattera but he declined both these stating \"I'm not going to take anything else, I've tried a lot of other things and I am done with that.\"  We discussed treatment options at great length.  He is interested in returning to [facility], I am happy to make a referral today.    Medication side effects and alternatives reviewed. Health promotion activities recommended and reviewed today. All questions addressed. Education and counseling " completed regarding risks and benefits of medications and psychotherapy options. Collaborative Care Psychiatry Service model reviewed today. Recommend therapy for additional support.     Treatment Plan:     I am referring you for long term psychiatry services and individual therapy at [facility] in [place].  [facility] will be contacting you to schedule this appointment.  If you do not hear from them you may contact them at [phone].    I am happy to start Wellbutrin or Strattera in the mean time, if you change your mind please feel free to contact us.    Continue all other medical directions per primary care provider.     Continue all other medications as reviewed per electronic medical record today.     Safety plan reviewed. To the Emergency Department as needed or call after hours crisis line at [phone] or [phone]. [place] Crisis Text Line: Text [place] to 226649  or  Suicide LifeLine Chat: suicideVente-privee.com.org/chat/    Follow up with primary care provider as planned or for acute medical concerns.    Call the psychiatric nurse line with medication questions or concerns at [phone].    Whatever may be used to communicate with your provider, but this is not intended to be used for emergencies.    Crisis Resources:    National Suicide Prevention Lifeline: 254.988.7326 (TTY: 758.106.7058). Call anytime for help.  (www.suicidepreventionlifeline.org)  National [place] on Mental Illness (www.kristie.org): 165.185.7159 or 802-897-4825.   Mental Health Association (www.mentalhealth.org): 363.839.8373 or 282-913-2233.  [place] Crisis Text Line: Text [place] to 416947  Suicide LifeLine Chat: suicideVente-privee.com.org/chat    Administrative Billing:   Time spent with patient was 60 minutes and greater than 50% of time or 40 minutes was spent in counseling and coordination of care regarding above diagnoses and treatment plan.    Patient Status:  The patient  is being referred to long term community psychiatry care and provider will provide bridging until patient is established with new community provider.     Signed:   Saundra Tristan MSN, APRN, CNP  Psychiatry

## 2019-12-17 NOTE — PATIENT INSTRUCTIONS
Treatment Plan:     I am referring you for long term psychiatry services and individual therapy at [facility] in [place].  [facility] will be contacting you to schedule this appointment.  If you do not hear from them you may contact them at [phone].    I am happy to start Wellbutrin or Strattera in the mean time, if you change your mind please feel free to contact us.    Continue all other medical directions per primary care provider.     Continue all other medications as reviewed per electronic medical record today.     Safety plan reviewed. To the Emergency Department as needed or call after hours crisis line at [phone] or [phone]. [place] Crisis Text Line: Text [place] to 227125  or  Suicide LifeLine Chat: twtMob.org/chat/    Follow up with primary care provider as planned or for acute medical concerns.    Call the psychiatric nurse line with medication questions or concerns at [phone].    [employer] may be used to communicate with your provider, but this is not intended to be used for emergencies.    Crisis Resources:    National Suicide Prevention Lifeline: 693.685.6117 (TTY: 532.987.2936). Call anytime for help.  (www.suicidepreventionlifeline.org)  [employer] [place] [employer] Mental Illness (www.kristie.org): 257.924.1554 or 363-289-9287.   Mental Health Association (www.mentalhealth.org): 274.522.9524 or 876-678-5501.  [place] Crisis Text Line: Text [place] to 538953  Suicide LifeLine Chat: suicideConsult A Doctor.org/chat

## 2019-12-18 ASSESSMENT — ANXIETY QUESTIONNAIRES: GAD7 TOTAL SCORE: 5

## 2020-03-09 ENCOUNTER — HOSPITAL ENCOUNTER (EMERGENCY)
Facility: CLINIC | Age: 35
Discharge: HOME | End: 2020-03-09
Attending: FAMILY MEDICINE | Admitting: FAMILY MEDICINE
Payer: COMMERCIAL

## 2020-03-09 VITALS
RESPIRATION RATE: 20 BRPM | OXYGEN SATURATION: 99 % | HEART RATE: 72 BPM | DIASTOLIC BLOOD PRESSURE: 60 MMHG | SYSTOLIC BLOOD PRESSURE: 121 MMHG | TEMPERATURE: 97.3 F

## 2020-03-09 DIAGNOSIS — F10.920 ALCOHOLIC INTOXICATION WITHOUT COMPLICATION (H): ICD-10-CM

## 2020-03-09 LAB — ALCOHOL BREATH TEST: 0.18 (ref 0–0.01)

## 2020-03-09 PROCEDURE — 99283 EMERGENCY DEPT VISIT LOW MDM: CPT | Mod: Z6 | Performed by: FAMILY MEDICINE

## 2020-03-09 PROCEDURE — 82075 ASSAY OF BREATH ETHANOL: CPT | Performed by: FAMILY MEDICINE

## 2020-03-09 PROCEDURE — 99283 EMERGENCY DEPT VISIT LOW MDM: CPT | Performed by: FAMILY MEDICINE

## 2020-03-09 NOTE — DISCHARGE INSTRUCTIONS
We are releasing you to the care of a sober adult who is willing to stay with you tonight.  Please go home and sleep.  Do not drink anymore alcohol as you have just over 2 times the legal limit in your system.  Return to the emergency department if you have thoughts of hurting yourself or others.

## 2020-03-09 NOTE — ED NOTES
Waiting on family member to come and get him if possible. Pt keeps pushing the boundaries. Security at bedside.

## 2020-03-09 NOTE — ED AVS SNAPSHOT
[facility] Emergency Department  [address] [place] [address]  Phone:  [phone]  Fax:  [phone]                                    Jorge Madrid   MRN: [id number]    Department:  [facility] Emergency Department   Date of Visit:  3/9/2020           After Visit Summary Signature Page    I have received my discharge instructions, and my questions have been answered. I have discussed any challenges I see with this plan with the nurse or doctor.    ..........................................................................................................................................  Patient/Patient Representative Signature      ..........................................................................................................................................  Patient Representative Print Name and Relationship to Patient    ..................................................               ................................................  Date                                   Time    ..........................................................................................................................................  Reviewed by Signature/Title    ...................................................              ..............................................  Date                                               Time          22EPIC Rev 08/18
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
## 2020-11-01 ENCOUNTER — HOSPITAL ENCOUNTER (EMERGENCY)
Facility: CLINIC | Age: 35
Discharge: HOME | End: 2020-11-01
Attending: FAMILY MEDICINE | Admitting: FAMILY MEDICINE
Payer: COMMERCIAL

## 2020-11-01 VITALS
OXYGEN SATURATION: 98 % | RESPIRATION RATE: 16 BRPM | SYSTOLIC BLOOD PRESSURE: 135 MMHG | DIASTOLIC BLOOD PRESSURE: 86 MMHG | HEART RATE: 77 BPM | WEIGHT: 260 LBS | BODY MASS INDEX: 40.84 KG/M2 | TEMPERATURE: 98 F

## 2020-11-01 DIAGNOSIS — J02.9 VIRAL PHARYNGITIS: ICD-10-CM

## 2020-11-01 LAB
DEPRECATED S PYO AG THROAT QL EIA: NEGATIVE
SPECIMEN SOURCE: NORMAL
SPECIMEN SOURCE: NORMAL
STREP GROUP A PCR: NOT DETECTED

## 2020-11-01 PROCEDURE — 999N001174 HC STATISTIC STREP A RAPID: Performed by: FAMILY MEDICINE

## 2020-11-01 PROCEDURE — 87651 STREP A DNA AMP PROBE: CPT | Performed by: FAMILY MEDICINE

## 2020-11-01 PROCEDURE — 99283 EMERGENCY DEPT VISIT LOW MDM: CPT | Performed by: FAMILY MEDICINE

## 2020-11-01 NOTE — ED AVS SNAPSHOT
[facility] Emergency Dept  [address] [place] [address]  Phone: [phone]  Fax: [phone]                                    Jorge Madrid   MRN: [id number]    Department: [facility] Emergency Dept   Date of Visit: 11/1/2020           After Visit Summary Signature Page    I have received my discharge instructions, and my questions have been answered. I have discussed any challenges I see with this plan with the nurse or doctor.    ..........................................................................................................................................  Patient/Patient Representative Signature      ..........................................................................................................................................  Patient Representative Print Name and Relationship to Patient    ..................................................               ................................................  Date                                   Time    ..........................................................................................................................................  Reviewed by Signature/Title    ...................................................              ..............................................  Date                                               Time          22EPIC Rev 08/18

## 2020-11-01 NOTE — DISCHARGE INSTRUCTIONS
Drink extra fluids.  Get extra rest.  Take Advil-ibuprofen 600 mg 3 times a day with food.  Salt water gargles.

## 2020-11-01 NOTE — ED PROVIDER NOTES
History     Chief Complaint   Patient presents with     Pharyngitis     HPI  Jorge Madrid is a 35 year old male who presents to the emergency department with a 3-day history of sore throat.  Denies fever or chills.  No cough or congestion.  No known Covid exposures.  No shortness of breath.  He does not otherwise feel ill.    Allergies:  Allergies   Allergen Reactions     No Known Drug Allergies        Problem List:    Patient Active Problem List    Diagnosis Date Noted     Morbid obesity (H) 06/13/2018     Priority: Medium     H/O cannabis abuse 01/12/2018     Priority: Medium     History of ETOH abuse 01/12/2018     Priority: Medium     Tobacco dependence 11/10/2011     Priority: Medium     ADHD (attention deficit hyperactivity disorder)      Priority: Medium     Overview:   Managed in , Adderall 80 mg daily       Bipolar 2 disorder (H)      Priority: Medium     Tobacco use disorder 12/14/2007     Priority: Medium     Bipolar disorder (H) 05/30/2007     Priority: Medium     Problem list name updated by automated process. Provider to review          Past Medical History:    Past Medical History:   Diagnosis Date     ADHD (attention deficit hyperactivity disorder) 2004     Bipolar 2 disorder (H) 2004     Deviated nasal septum      Obesity      Tobacco use disorder        Past Surgical History:    Past Surgical History:   Procedure Laterality Date     SURGICAL HISTORY OF -   1997    Meatoplasty with urethral dilatation.       Family History:    Family History   Problem Relation Age of Onset     No Known Problems Mother      No Known Problems Father      Diabetes Maternal Grandmother      Cerebrovascular Disease Maternal Grandmother      No Known Problems Maternal Grandfather      No Known Problems Paternal Grandmother      Heart Disease Paternal Grandfather        Social History:  Marital Status:  Single [1]  Social History     Tobacco Use     Smoking status: Current Every Day Smoker     Packs/day: 0.50      Years: 17.00     Pack years: 8.50     Types: Cigarettes     Smokeless tobacco: Never Used     Tobacco comment: started age 15   Substance Use Topics     Alcohol use: No     Comment: last use  5/4/18     Drug use: No     Comment: quit-2009        Medications:         amphetamine-dextroamphetamine (ADDERALL XR) 20 MG 24 hr capsule          Review of Systems   All other systems reviewed and are negative.      Physical Exam   BP: 135/86  Pulse: 77  Temp: 98  F (36.7  C)  Resp: 16  Weight: 117.9 kg (260 lb)  SpO2: 98 %      Physical Exam  Vitals signs and nursing note reviewed.   Constitutional:       Appearance: He is well-developed. He is obese. He is not ill-appearing or toxic-appearing.   HENT:      Head: Normocephalic and atraumatic.      Right Ear: Tympanic membrane and ear canal normal.      Left Ear: Tympanic membrane and ear canal normal.      Mouth/Throat:      Mouth: Mucous membranes are moist.      Pharynx: No pharyngeal swelling, oropharyngeal exudate or posterior oropharyngeal erythema.      Tonsils: No tonsillar exudate or tonsillar abscesses.   Eyes:      Conjunctiva/sclera: Conjunctivae normal.   Neck:      Musculoskeletal: Normal range of motion and neck supple.   Cardiovascular:      Rate and Rhythm: Normal rate and regular rhythm.      Heart sounds: Normal heart sounds.   Pulmonary:      Effort: Pulmonary effort is normal.      Breath sounds: Normal breath sounds.   Abdominal:      General: Bowel sounds are normal.      Palpations: Abdomen is soft.   Skin:     General: Skin is warm and dry.      Capillary Refill: Capillary refill takes less than 2 seconds.   Neurological:      General: No focal deficit present.      Mental Status: He is alert.   Psychiatric:         Mood and Affect: Mood normal.         ED Course        Procedures               Critical Care time:  none               Results for orders placed or performed during the hospital encounter of 11/01/20 (from the past 24 hour(s))    Streptococcus A Rapid Scr w Reflx to PCR    Specimen: Throat   Result Value Ref Range    Strep Specimen Description Throat     Streptococcus Group A Rapid Screen Negative NEG^Negative       Medications - No data to display    Assessments & Plan (with Medical Decision Making)   MDM--35-year-old with a 3-day history of sore throat.  Strep is negative.  Exam is unremarkable.  Patient was reassured.  I did not check him for Covid as there is no fever cough or other symptoms.  Patient requested something for pain and I recommended ibuprofen 600 mg 3 times a day with food, salt water gargles.  Patient discharged in stable condition.  I have reviewed the nursing notes.    I have reviewed the findings, diagnosis, plan and need for follow up with the patient.       New Prescriptions    No medications on file       Final diagnoses:   Viral pharyngitis       11/1/2020   [facility] EMERGENCY DEPT     Diandra, Ramakrishna AL MD  11/01/20 4721

## 2021-01-03 ENCOUNTER — HOSPITAL ENCOUNTER (EMERGENCY)
Facility: CLINIC | Age: 36
Discharge: HOME | End: 2021-01-03
Attending: FAMILY MEDICINE | Admitting: FAMILY MEDICINE
Payer: COMMERCIAL

## 2021-01-03 ENCOUNTER — APPOINTMENT (OUTPATIENT)
Dept: CT IMAGING | Facility: CLINIC | Age: 36
End: 2021-01-03
Attending: FAMILY MEDICINE
Payer: COMMERCIAL

## 2021-01-03 VITALS
TEMPERATURE: 97.8 F | DIASTOLIC BLOOD PRESSURE: 78 MMHG | SYSTOLIC BLOOD PRESSURE: 120 MMHG | OXYGEN SATURATION: 99 % | RESPIRATION RATE: 16 BRPM | HEART RATE: 72 BPM

## 2021-01-03 DIAGNOSIS — S10.93XA CONTUSION OF FACE, SCALP AND NECK, INITIAL ENCOUNTER: ICD-10-CM

## 2021-01-03 DIAGNOSIS — F19.921: ICD-10-CM

## 2021-01-03 DIAGNOSIS — F15.929 METHAMPHETAMINE INTOXICATION (H): ICD-10-CM

## 2021-01-03 DIAGNOSIS — S00.83XA CONTUSION OF FACE, SCALP AND NECK, INITIAL ENCOUNTER: ICD-10-CM

## 2021-01-03 DIAGNOSIS — F10.921 ALCOHOL INTOXICATION, WITH DELIRIUM (H): ICD-10-CM

## 2021-01-03 DIAGNOSIS — T07.XXXA ABRASIONS OF MULTIPLE SITES: ICD-10-CM

## 2021-01-03 DIAGNOSIS — S00.03XA CONTUSION OF FACE, SCALP AND NECK, INITIAL ENCOUNTER: ICD-10-CM

## 2021-01-03 LAB
ALBUMIN SERPL-MCNC: 4.2 G/DL (ref 3.4–5)
ALP SERPL-CCNC: 65 U/L (ref 40–150)
ALT SERPL W P-5'-P-CCNC: 35 U/L (ref 0–70)
AMPHETAMINES UR QL: ABNORMAL NG/ML
ANION GAP SERPL CALCULATED.3IONS-SCNC: 10 MMOL/L (ref 3–14)
APAP SERPL-MCNC: <2 MG/L (ref 10–20)
AST SERPL W P-5'-P-CCNC: 39 U/L (ref 0–45)
BARBITURATES UR QL SCN: NOT DETECTED NG/ML
BASOPHILS # BLD AUTO: 0.1 10E9/L (ref 0–0.2)
BASOPHILS NFR BLD AUTO: 0.6 %
BENZODIAZ UR QL SCN: NOT DETECTED NG/ML
BILIRUB SERPL-MCNC: 0.3 MG/DL (ref 0.2–1.3)
BUN SERPL-MCNC: 15 MG/DL (ref 7–30)
BUPRENORPHINE UR QL: NOT DETECTED NG/ML
CALCIUM SERPL-MCNC: 9.1 MG/DL (ref 8.5–10.1)
CANNABINOIDS UR QL: NOT DETECTED NG/ML
CHLORIDE SERPL-SCNC: 106 MMOL/L (ref 94–109)
CO2 SERPL-SCNC: 26 MMOL/L (ref 20–32)
COCAINE UR QL SCN: NOT DETECTED NG/ML
CREAT SERPL-MCNC: 0.77 MG/DL (ref 0.66–1.25)
D-METHAMPHET UR QL: ABNORMAL NG/ML
DIFFERENTIAL METHOD BLD: NORMAL
EOSINOPHIL NFR BLD AUTO: 5.5 %
ERYTHROCYTE [DISTWIDTH] IN BLOOD BY AUTOMATED COUNT: 12.7 % (ref 10–15)
ETHANOL SERPL-MCNC: 0.25 G/DL
GFR SERPL CREATININE-BSD FRML MDRD: >90 ML/MIN/{1.73_M2}
GLUCOSE SERPL-MCNC: 96 MG/DL (ref 70–99)
HCT VFR BLD AUTO: 42.2 % (ref 40–53)
HGB BLD-MCNC: 13.9 G/DL (ref 13.3–17.7)
IMM GRANULOCYTES # BLD: 0.1 10E9/L (ref 0–0.4)
IMM GRANULOCYTES NFR BLD: 0.5 %
LYMPHOCYTES # BLD AUTO: 3.5 10E9/L (ref 0.8–5.3)
LYMPHOCYTES NFR BLD AUTO: 36.4 %
MCH RBC QN AUTO: 31 PG (ref 26.5–33)
MCHC RBC AUTO-ENTMCNC: 32.9 G/DL (ref 31.5–36.5)
MCV RBC AUTO: 94 FL (ref 78–100)
METHADONE UR QL SCN: NOT DETECTED NG/ML
MONOCYTES # BLD AUTO: 0.9 10E9/L (ref 0–1.3)
MONOCYTES NFR BLD AUTO: 9.6 %
NEUTROPHILS # BLD AUTO: 4.6 10E9/L (ref 1.6–8.3)
NEUTROPHILS NFR BLD AUTO: 47.4 %
NRBC # BLD AUTO: 0 10*3/UL
NRBC BLD AUTO-RTO: 0 /100
OPIATES UR QL SCN: NOT DETECTED NG/ML
OXYCODONE UR QL SCN: NOT DETECTED NG/ML
PCP UR QL SCN: NOT DETECTED NG/ML
PLATELET # BLD AUTO: 366 10E9/L (ref 150–450)
POTASSIUM SERPL-SCNC: 3.2 MMOL/L (ref 3.4–5.3)
PROPOXYPH UR QL: NOT DETECTED NG/ML
PROT SERPL-MCNC: 7.5 G/DL (ref 6.8–8.8)
RBC # BLD AUTO: 4.48 10E12/L (ref 4.4–5.9)
SALICYLATES SERPL-MCNC: 2 MG/DL
SODIUM SERPL-SCNC: 142 MMOL/L (ref 133–144)
TRICYCLICS UR QL SCN: NOT DETECTED NG/ML
WBC # BLD AUTO: 9.7 10E9/L (ref 4–11)

## 2021-01-03 PROCEDURE — 82077 ASSAY SPEC XCP UR&BREATH IA: CPT | Performed by: FAMILY MEDICINE

## 2021-01-03 PROCEDURE — 96376 TX/PRO/DX INJ SAME DRUG ADON: CPT | Performed by: FAMILY MEDICINE

## 2021-01-03 PROCEDURE — 80053 COMPREHEN METABOLIC PANEL: CPT | Performed by: FAMILY MEDICINE

## 2021-01-03 PROCEDURE — 96365 THER/PROPH/DIAG IV INF INIT: CPT | Performed by: FAMILY MEDICINE

## 2021-01-03 PROCEDURE — 99292 CRITICAL CARE ADDL 30 MIN: CPT | Mod: 25 | Performed by: FAMILY MEDICINE

## 2021-01-03 PROCEDURE — 99291 CRITICAL CARE FIRST HOUR: CPT | Mod: 25 | Performed by: FAMILY MEDICINE

## 2021-01-03 PROCEDURE — 80179 DRUG ASSAY SALICYLATE: CPT | Performed by: FAMILY MEDICINE

## 2021-01-03 PROCEDURE — 93005 ELECTROCARDIOGRAM TRACING: CPT | Performed by: FAMILY MEDICINE

## 2021-01-03 PROCEDURE — 258N000003 HC RX IP 258 OP 636: Performed by: FAMILY MEDICINE

## 2021-01-03 PROCEDURE — 85025 COMPLETE CBC W/AUTO DIFF WBC: CPT | Performed by: FAMILY MEDICINE

## 2021-01-03 PROCEDURE — 250N000011 HC RX IP 250 OP 636: Performed by: FAMILY MEDICINE

## 2021-01-03 PROCEDURE — 96361 HYDRATE IV INFUSION ADD-ON: CPT | Performed by: FAMILY MEDICINE

## 2021-01-03 PROCEDURE — 82075 ASSAY OF BREATH ETHANOL: CPT | Performed by: FAMILY MEDICINE

## 2021-01-03 PROCEDURE — 51798 US URINE CAPACITY MEASURE: CPT | Performed by: FAMILY MEDICINE

## 2021-01-03 PROCEDURE — 93010 ELECTROCARDIOGRAM REPORT: CPT | Performed by: FAMILY MEDICINE

## 2021-01-03 PROCEDURE — 80143 DRUG ASSAY ACETAMINOPHEN: CPT | Performed by: FAMILY MEDICINE

## 2021-01-03 PROCEDURE — 51702 INSERT TEMP BLADDER CATH: CPT | Performed by: FAMILY MEDICINE

## 2021-01-03 PROCEDURE — 96375 TX/PRO/DX INJ NEW DRUG ADDON: CPT | Performed by: FAMILY MEDICINE

## 2021-01-03 PROCEDURE — 250N000009 HC RX 250: Performed by: FAMILY MEDICINE

## 2021-01-03 PROCEDURE — 96372 THER/PROPH/DIAG INJ SC/IM: CPT | Performed by: FAMILY MEDICINE

## 2021-01-03 PROCEDURE — 80306 DRUG TEST PRSMV INSTRMNT: CPT | Performed by: FAMILY MEDICINE

## 2021-01-03 PROCEDURE — 70450 CT HEAD/BRAIN W/O DYE: CPT

## 2021-01-03 RX ORDER — MIDAZOLAM HYDROCHLORIDE 5 MG/ML
2.5 INJECTION, SOLUTION INTRAMUSCULAR; INTRAVENOUS
Status: DISCONTINUED | OUTPATIENT
Start: 2021-01-03 | End: 2021-01-03

## 2021-01-03 RX ORDER — OLANZAPINE 10 MG/2ML
10 INJECTION, POWDER, FOR SOLUTION INTRAMUSCULAR ONCE
Status: COMPLETED | OUTPATIENT
Start: 2021-01-03 | End: 2021-01-03

## 2021-01-03 RX ORDER — ONDANSETRON 2 MG/ML
4 INJECTION INTRAMUSCULAR; INTRAVENOUS EVERY 30 MIN PRN
Status: DISCONTINUED | OUTPATIENT
Start: 2021-01-03 | End: 2021-01-03 | Stop reason: HOSPADM

## 2021-01-03 RX ORDER — SODIUM CHLORIDE 9 MG/ML
INJECTION, SOLUTION INTRAVENOUS CONTINUOUS
Status: DISCONTINUED | OUTPATIENT
Start: 2021-01-03 | End: 2021-01-03 | Stop reason: HOSPADM

## 2021-01-03 RX ADMIN — ONDANSETRON 4 MG: 2 INJECTION INTRAMUSCULAR; INTRAVENOUS at 04:02

## 2021-01-03 RX ADMIN — MIDAZOLAM 2 MG: 1 INJECTION INTRAMUSCULAR; INTRAVENOUS at 03:58

## 2021-01-03 RX ADMIN — OLANZAPINE 10 MG: 10 INJECTION, POWDER, FOR SOLUTION INTRAMUSCULAR at 02:03

## 2021-01-03 RX ADMIN — ONDANSETRON 4 MG: 2 INJECTION INTRAMUSCULAR; INTRAVENOUS at 02:05

## 2021-01-03 RX ADMIN — SODIUM CHLORIDE: 9 INJECTION, SOLUTION INTRAVENOUS at 04:34

## 2021-01-03 RX ADMIN — SODIUM CHLORIDE 500 ML: 9 INJECTION, SOLUTION INTRAVENOUS at 07:16

## 2021-01-03 RX ADMIN — FOLIC ACID: 5 INJECTION, SOLUTION INTRAMUSCULAR; INTRAVENOUS; SUBCUTANEOUS at 03:06

## 2021-01-03 NOTE — DISCHARGE INSTRUCTIONS
I recommend you not use methamphetamine or alcohol or other mind altering substances.  I recommend you get back into treatment.

## 2021-01-03 NOTE — ED NOTES
Patient awakened to go to CT. Initially, patient did not want the scan. Dr. Ellsworthor in to speak with patient and now patient is agreeable and cooperative with having CT.

## 2021-01-03 NOTE — ED NOTES
Talked with patient about what had happened. Pt does not remember most of visit. Pt given emotional support and encouragement about stopping drug and ETOH use. Pt encouraged to use healthy coping skills.Pt cooperative and did listen. Attempting to find ride. Called mom and brother.

## 2021-01-03 NOTE — ED PROVIDER NOTES
History     Chief Complaint   Patient presents with     Alcohol Intoxication     HPI  Jorge Madrid is a 35 year old male who presents to the ER via ambulance in full restraints escorted by 2  officers with concerns about acute intoxication with violent behavior issues.  EMS reported that police were called to the home of the patient's parents.  They stated the parents told him that he the patient had been drinking large amounts of alcohol and was becoming violent in their home.  The police officers arrested the patient and placed him in their squad car and he started tearing the back of the squad car apart so 911 was called.  EMS stated on arrival the squad car was violently moving with the patient inside striking the inside the car with his forehead shoulders legs and arms.  They gave him IM ketamine 250 mg in the attempt to safely restrain him.  They placed him in four-point extremity restraints and transported him to the ER.  During the transport he did finally calm down with the onset of the ketamine.  He was still yelling out at times and spitting so they placed a spit muhammad over the patient's head.  Please officer reported that they are from [place] with the patient from past calls to his home.  He is unknown drug abuser.  They did not know any past medical history other than his drug abuse history.  Patient is nonverbal other than yelling out at times and is unable to give us any review of systems.      Allergies:  Allergies   Allergen Reactions     No Known Drug Allergies        Problem List:    Patient Active Problem List    Diagnosis Date Noted     Morbid obesity (H) 06/13/2018     Priority: Medium     H/O cannabis abuse 01/12/2018     Priority: Medium     History of ETOH abuse 01/12/2018     Priority: Medium     Tobacco dependence 11/10/2011     Priority: Medium     ADHD (attention deficit hyperactivity disorder)      Priority: Medium     Overview:   Managed in TC, Adderall 80 mg daily        Bipolar 2 disorder (H)      Priority: Medium     Tobacco use disorder 12/14/2007     Priority: Medium     Bipolar disorder (H) 05/30/2007     Priority: Medium     Problem list name updated by automated process. Provider to review          Past Medical History:    Past Medical History:   Diagnosis Date     ADHD (attention deficit hyperactivity disorder) 2004     Bipolar 2 disorder (H) 2004     Deviated nasal septum      Obesity      Tobacco use disorder        Past Surgical History:    Past Surgical History:   Procedure Laterality Date     SURGICAL HISTORY OF -   1997    Meatoplasty with urethral dilatation.       Family History:    Family History   Problem Relation Age of Onset     No Known Problems Mother      No Known Problems Father      Diabetes Maternal Grandmother      Cerebrovascular Disease Maternal Grandmother      No Known Problems Maternal Grandfather      No Known Problems Paternal Grandmother      Heart Disease Paternal Grandfather        Social History:  Marital Status:  Single [1]  Social History     Tobacco Use     Smoking status: Current Every Day Smoker     Packs/day: 0.50     Years: 17.00     Pack years: 8.50     Types: Cigarettes     Smokeless tobacco: Never Used     Tobacco comment: started age 15   Substance Use Topics     Alcohol use: No     Comment: last use  5/4/18     Drug use: No     Comment: quit-2009        Medications:         amphetamine-dextroamphetamine (ADDERALL XR) 20 MG 24 hr capsule      Immunization History   Administered Date(s) Administered     DTAP (<7y) 07/15/1991     MMR 07/15/1991, 03/31/1998     TD (ADULT, 7+) 07/15/1991, 08/31/1998, 04/17/2004     Tdap (Adult) Unspecified Formulation 07/15/1991, 09/04/2012, 01/03/2016       Review of Systems   Unable to perform ROS: Mental status change       Physical Exam   BP: (!) 138/96  Pulse: 85  Temp: 97.8  F (36.6  C)  Resp: 16  SpO2: 98 %      Physical Exam  Vitals signs and nursing note reviewed.   Constitutional:       General:  He is in acute distress.      Appearance: He is obese. He is toxic-appearing and diaphoretic.      Comments: Patient arrived with a spit muhammad on his head and appeared to be sedated but with occasional outbursts of vocalization.  Appears to be moving all extremities spontaneously at times.  He does have some evidence for some swelling and superficial abrasions to his forehead and scalp.  No bleeding open wounds identified on his body.  Patient is in four-point restraints on the EMS cart.  Patient was moved from the cart onto the ER exam cart and four-point restraints were placed.  We were assisted by the  as well as security as well as the EMS personnel at that time.   HENT:      Head: Contusion present. No raccoon eyes, Eid's sign or laceration.        Right Ear: Tympanic membrane normal. No drainage. No foreign body. No hemotympanum.      Left Ear: Tympanic membrane normal. No drainage. No foreign body. No hemotympanum.      Nose: No signs of injury.      Right Nostril: No epistaxis.      Left Nostril: No epistaxis.      Mouth/Throat:      Mouth: Mucous membranes are dry. No injury.      Comments: Patient with poor dentition throughout his mouth.  No obvious abscess or trauma identified on exam of the mouth.  Eyes:      Comments: Pupils are equal but sluggish to respond to light.   Neck:      Comments: Patient moves his neck freely and does not appear to be in pain with motion.  No obvious deformity or injury to the neck identified on exam.  Cardiovascular:      Rate and Rhythm: Normal rate.      Pulses: Normal pulses.   Pulmonary:      Breath sounds: No stridor. No wheezing, rhonchi or rales.      Comments: Patient's lung sounds are clear and he was oxygenating well but had occasional apneic episodes on arrival to the ER.   Abdominal:      General: There is no distension.      Palpations: Abdomen is soft.      Comments: Patient with obese abdomen no signs of ecchymosis/bruising identified to the  abdomen.  No tenderness elicited with palpation to the abdomen.   Genitourinary:     Comments: Uncircumcised male no evidence for trauma to the penis testicle or perineal area.  Musculoskeletal:         General: No swelling or deformity.      Right lower leg: No edema.      Left lower leg: No edema.   Skin:     Capillary Refill: Capillary refill takes less than 2 seconds.      Coloration: Skin is not jaundiced.      Findings: No rash.      Comments: Patient with multiple excoriations to his skin suggestive of possible methamphetamine use or skin infectious issue.  No significant erythema noted and no drainage noted from the sites.   Neurological:      Mental Status: He is lethargic and disoriented.      Comments: Patient responsive to painful stimulus and verbal stimulus with verbalizations that were not understandable.  Patient was singing and also repeating words spoken to him at times.  Patient's mentation consistent with history of ketamine administration.  Patient with strong odor of alcohol noted on his breath.  Patient does spontaneously move all extremities.  No facial muscle weakness identified on exam.   Psychiatric:         Behavior: Behavior is uncooperative, agitated and aggressive.         Cognition and Memory: Cognition is impaired.         ED Course     ED Course as of Jan 03 0907   Sun Jan 03, 2021   0329 Patient was removed from his restraints as he has been sleeping.  He is being closely monitored for any increased agitation.  Patient with elevated alcohol level as well as methamphetamines found in his urine tox screen.  Pupils continue to be equal.      0636 Patient reexamined and is been moved to room for.  Continues on a one-to-one watch but has been sleeping soundly since the Ugalde catheter placed.  Vitals have been stable.  We will continue to monitor closely.        Procedures               EKG Interpretation: #1     Interpreted by Yury Robin DO  Time reviewed: 02:55  Symptoms  at time of EKG: Excited delirium associated with acute alcohol and methamphetamine intoxication  Rhythm: normal sinus   Rate: Normal  Axis: Normal  Ectopy: none  Conduction: normal  ST Segments/ T Waves: No acute ischemic changes  Q Waves: none  Comparison to prior: No old EKG available    Clinical Impression: normal EKG      Critical Care time:  was 40 minutes for this patient excluding procedures.               Results for orders placed or performed during the hospital encounter of 01/03/21 (from the past 24 hour(s))   CBC with platelets differential   Result Value Ref Range    WBC 9.7 4.0 - 11.0 10e9/L    RBC Count 4.48 4.4 - 5.9 10e12/L    Hemoglobin 13.9 13.3 - 17.7 g/dL    Hematocrit 42.2 40.0 - 53.0 %    MCV 94 78 - 100 fl    MCH 31.0 26.5 - 33.0 pg    MCHC 32.9 31.5 - 36.5 g/dL    RDW 12.7 10.0 - 15.0 %    Platelet Count 366 150 - 450 10e9/L    Diff Method Automated Method     % Neutrophils 47.4 %    % Lymphocytes 36.4 %    % Monocytes 9.6 %    % Eosinophils 5.5 %    % Basophils 0.6 %    % Immature Granulocytes 0.5 %    Nucleated RBCs 0 0 /100    Absolute Neutrophil 4.6 1.6 - 8.3 10e9/L    Absolute Lymphocytes 3.5 0.8 - 5.3 10e9/L    Absolute Monocytes 0.9 0.0 - 1.3 10e9/L    Absolute Basophils 0.1 0.0 - 0.2 10e9/L    Abs Immature Granulocytes 0.1 0 - 0.4 10e9/L    Absolute Nucleated RBC 0.0    Comprehensive metabolic panel   Result Value Ref Range    Sodium 142 133 - 144 mmol/L    Potassium 3.2 (L) 3.4 - 5.3 mmol/L    Chloride 106 94 - 109 mmol/L    Carbon Dioxide 26 20 - 32 mmol/L    Anion Gap 10 3 - 14 mmol/L    Glucose 96 70 - 99 mg/dL    Urea Nitrogen 15 7 - 30 mg/dL    Creatinine 0.77 0.66 - 1.25 mg/dL    GFR Estimate >90 >60 mL/min/[1.73_m2]    GFR Estimate If Black >90 >60 mL/min/[1.73_m2]    Calcium 9.1 8.5 - 10.1 mg/dL    Bilirubin Total 0.3 0.2 - 1.3 mg/dL    Albumin 4.2 3.4 - 5.0 g/dL    Protein Total 7.5 6.8 - 8.8 g/dL    Alkaline Phosphatase 65 40 - 150 U/L    ALT 35 0 - 70 U/L    AST 39 0 - 45  U/L   Alcohol ethyl   Result Value Ref Range    Ethanol g/dL 0.25 (H) <0.01 g/dL   Salicylate level   Result Value Ref Range    Salicylate Level 2 mg/dL   Acetaminophen level   Result Value Ref Range    Acetaminophen Level <2 mg/L   Urine Drugs of Abuse Screen Panel 13   Result Value Ref Range    Cannabinoids (26-xrp-0-carboxy-9-THC) Not Detected NDET^Not Detected ng/mL    Phencyclidine (Phencyclidine) Not Detected NDET^Not Detected ng/mL    Cocaine (Benzoylecgonine) Not Detected NDET^Not Detected ng/mL    Methamphetamine (d-Methamphetamine) Detected, Abnormal Result (A) NDET^Not Detected ng/mL    Opiates (Morphine) Not Detected NDET^Not Detected ng/mL    Amphetamine (d-Amphetamine) Detected, Abnormal Result (A) NDET^Not Detected ng/mL    Benzodiazepines (Nordiazepam) Not Detected NDET^Not Detected ng/mL    Tricyclic Antidepressants (Desipramine) Not Detected NDET^Not Detected ng/mL    Methadone (Methadone) Not Detected NDET^Not Detected ng/mL    Barbiturates (Butalbital) Not Detected NDET^Not Detected ng/mL    Oxycodone (Oxycodone) Not Detected NDET^Not Detected ng/mL    Propoxyphene (Norpropoxyphene) Not Detected NDET^Not Detected ng/mL    Buprenorphine (Buprenorphine) Not Detected NDET^Not Detected ng/mL   CT Head w/o Contrast    Narrative    CT OF THE HEAD WITHOUT CONTRAST 1/3/2021 8:54 AM     COMPARISON: Head CT 5/12/2009    HISTORY:  Altered level of consciousness (LOC), unexplained.    TECHNIQUE: Axial CT images of the head from the skull base to the  vertex were acquired without IV contrast.    FINDINGS: The ventricles and basal cisterns are within normal limits  in configuration. There is no midline shift. There are no extra-axial  fluid collections.  Gray-white differentiation is well maintained.    No intracranial hemorrhage, mass or recent infarct.    The visualized paranasal sinuses are well-aerated. There is no  mastoiditis. There are no fractures of the visualized bones.      Impression    IMPRESSION:   Normal head CT.      Radiation dose for this scan was reduced using automated exposure  control, adjustment of the mA and/or kV according to patient size, or  iterative reconstruction technique       Medications   ondansetron (ZOFRAN) injection 4 mg (4 mg Intravenous Given 1/3/21 0402)   midazolam (VERSED) injection 2.5 mg (2 mg Intravenous Given 1/3/21 0358)   sodium chloride 0.9% infusion ( Intravenous Stopped 1/3/21 0713)   OLANZapine (zyPREXA) injection 10 mg (10 mg Intramuscular Given 1/3/21 0203)   sodium chloride 0.9 % 1,000 mL with Infuvite Adult 10 mL, thiamine 100 mg, folic acid 1 mg, magnesium sulfate 2 g infusion ( Intravenous Stopped 1/3/21 0345)   0.9% sodium chloride BOLUS (0 mLs Intravenous Stopped 1/3/21 0816)       Assessments & Plan (with Medical Decision Making)  35-year-old male to the ER via ambulance company by 2 's officers secondary to very aggressive behaviors associated with reported alcohol intoxication/heavy drinking.  Patient had to be restrained both physically and chemically in route from his parents home to the emergency room by the EMS crew.  Patient with exam findings consistent with acute alcohol intoxication as well as methamphetamine intoxication.  I suspect these intoxicants causing an excited delirium with aggressive behaviors.  Additional medications used to help control his aggressive behaviors and to allow us to take him out of the physical restraints.  Patient woke at around 4:00 quite agitated and was given additional medication in the form of Versed.  A bladder scan was performed and he was found to have over 1000 cc in his bladder and the likely reason for his increased agitation.  A catheter was placed and the patient was less agitated.  As the patient stabilizes, will work to get the CT scan of his head done on the potential risk of head injury given the contusion to his forehead scalp area.     I signed out the patient's care to Dr. Jim at shift change. I  notified the patient that Dr. Jim would be continuing to care for him and follow-up on the results of the tests. Please review Dr. Jim's ED note for further details on the care and disposition of Jorge Madrid.       I have reviewed the nursing notes.    I have reviewed the findings, diagnosis, plan and need for follow up with the patient.     Critical Care Addendum    My initial assessment, based on my review of prehospital provider report, review of nursing observations, review of vital signs, focused history, physical exam and review of cardiac rhythm monitor, established that Jorge Madrid has severe agitation and altered mental status, which requires immediate intervention, and therefore he is critically ill.     After the initial assessment, the care team initiated multiple lab tests, initiated medication therapy with IM Zyprexa, IV Versed, and IV Zofran.  Patient also received banana bag. and initiated physical restraints and One-to-one monitoring by the nursing staff to provide stabilization care to provide stabilization care. Due to the critical nature of this patient, I reassessed nursing observations, vital signs, physical exam, review of cardiac rhythm monitor, 12 lead ECG analysis, interpretation of Blood test and urine tox screen results, mental status and respiratory status multiple times prior to his disposition.     Time also spent performing documentation, reviewing test results and coordination of care.     Critical care time (excluding teaching time and procedures): 40 minutes.       Addendum --7:15 AM--I took over the care of the patient at 7 AM at shift change.  Patient evaluated.  He is sleeping.  He has a Ugalde catheter in place which is draining clear urine.  He is receiving an IV bolus of normal saline.  His pressure is stabilized.  Vital signs stable.  He is breathing at ease.  He was awakened by nursing personnel but would not cooperate with the CT.  He was allowed  to sleep.  We will still try to get a CT scan later.  Hopefully he will be more cooperative as he eddi up.  9:07 AM--patient initially refused head CT.  However was cooperative when I explained the necessity of obtaining the head CT with his history of head injury last night.  CT results are normal.  Patient much more cooperative.  Following direction and answering questions appropriately.  11:03 AM--patient awake and requesting to leave.  Blood alcohol is 0 on a breathalyzer.  He is oriented x3.  IV and catheter were discontinued.  Patient requested we call his mother and we called the number and left a message.  He is apparently not under arrest.  I offered assistance with getting him into detox or treatment and he declined.  He is exhibiting no psychosis.  Acute delirium appears to have cleared and to have been drug related.  Urine positive for amphetamine and methamphetamine.  Blood alcohol is returned to 0.  Patient discharged in stable condition.  Head CT is normal.  Neurological exam is normal.          Final diagnoses:   Acute drug intoxication, with delirium (H)   Methamphetamine intoxication (H)   Alcohol intoxication, with delirium (H)   Contusion of face, scalp and neck, initial encounter   Abrasions of multiple sites       1/3/2021   [facility] EMERGENCY DEPT     Yury Robin,   01/03/21 6631       Diandra, Ramakrishna AL MD  01/03/21 3961

## 2021-01-03 NOTE — ED TRIAGE NOTES
Patient arrives via EMS, restrained with police present. 250 mg IM Ketamine given en route. Intoxicated and physically fighting with police on scene.

## 2021-01-03 NOTE — ED AVS SNAPSHOT
[facility] Emergency Dept  [address] [place] [address]  Phone: [phone]  Fax: [phone]                                    Jorge Madrid   MRN: [id number]    Department: [facility] Emergency [facility]   Date of Visit: 1/3/2021           After Visit Summary Signature Page    I have received my discharge instructions, and my questions have been answered. I have discussed any challenges I see with this plan with the nurse or doctor.    ..........................................................................................................................................  Patient/Patient Representative Signature      ..........................................................................................................................................  Patient Representative Print Name and Relationship to Patient    ..................................................               ................................................  Date                                   Time    ..........................................................................................................................................  Reviewed by Signature/Title    ...................................................              ..............................................  Date                                               Time          22EPIC Rev 08/18

## 2021-01-03 NOTE — ED NOTES
Report from Debra AL RN and care assumed. Patient is sleeping on his right side with BP cuff on his left arm. BP of 78 systolic noted. NS bolus ordered and infusing. Patient arouses briefly when spoken to. He refuses to lie on his back and refuses to allow BP cuff on his right arm. O2 continues at 2.5L/NC. urine noted in knutson cath container.

## 2021-01-03 NOTE — ED NOTES
Patient starting to get agitated, 2mg IV Versed given. Urinated small amount, bladder scanned for >999ml. Catheter placed at this time per MD order, 1250 ml immediate return. Patient now resting comfortably.

## 2021-01-03 NOTE — ED NOTES
"Patient is back from CT and placed back on Cardiac/BP/Oximetry. He is now on RA. He asks \"Where am I\" and \"I just want to rest\". Waiting for CT report  "

## 2024-03-05 ENCOUNTER — OFFICE VISIT (OUTPATIENT)
Dept: FAMILY MEDICINE | Facility: CLINIC | Age: 39
End: 2024-03-05
Payer: COMMERCIAL

## 2024-03-05 VITALS
DIASTOLIC BLOOD PRESSURE: 88 MMHG | BODY MASS INDEX: 46.07 KG/M2 | OXYGEN SATURATION: 99 % | WEIGHT: 304 LBS | HEART RATE: 90 BPM | HEIGHT: 68 IN | SYSTOLIC BLOOD PRESSURE: 124 MMHG | TEMPERATURE: 97.6 F

## 2024-03-05 DIAGNOSIS — E66.01 MORBID OBESITY (H): ICD-10-CM

## 2024-03-05 DIAGNOSIS — F12.11 H/O CANNABIS ABUSE: ICD-10-CM

## 2024-03-05 DIAGNOSIS — F17.200 TOBACCO USE DISORDER: ICD-10-CM

## 2024-03-05 DIAGNOSIS — Z00.00 ROUTINE GENERAL MEDICAL EXAMINATION AT A HEALTH CARE FACILITY: Primary | ICD-10-CM

## 2024-03-05 DIAGNOSIS — F31.81 BIPOLAR 2 DISORDER (H): ICD-10-CM

## 2024-03-05 DIAGNOSIS — F10.11 HISTORY OF ETOH ABUSE: ICD-10-CM

## 2024-03-05 DIAGNOSIS — F90.1 ATTENTION DEFICIT HYPERACTIVITY DISORDER (ADHD), PREDOMINANTLY HYPERACTIVE TYPE: ICD-10-CM

## 2024-03-05 PROCEDURE — 99204 OFFICE O/P NEW MOD 45 MIN: CPT | Mod: 25 | Performed by: FAMILY MEDICINE

## 2024-03-05 PROCEDURE — 99385 PREV VISIT NEW AGE 18-39: CPT | Performed by: FAMILY MEDICINE

## 2024-03-05 RX ORDER — DEXTROAMPHETAMINE SACCHARATE, AMPHETAMINE ASPARTATE MONOHYDRATE, DEXTROAMPHETAMINE SULFATE AND AMPHETAMINE SULFATE 7.5; 7.5; 7.5; 7.5 MG/1; MG/1; MG/1; MG/1
30 CAPSULE, EXTENDED RELEASE ORAL DAILY
COMMUNITY
Start: 2024-02-05 | End: 2024-03-05

## 2024-03-05 RX ORDER — DEXTROAMPHETAMINE SACCHARATE, AMPHETAMINE ASPARTATE MONOHYDRATE, DEXTROAMPHETAMINE SULFATE AND AMPHETAMINE SULFATE 2.5; 2.5; 2.5; 2.5 MG/1; MG/1; MG/1; MG/1
10 CAPSULE, EXTENDED RELEASE ORAL DAILY
Qty: 30 CAPSULE | Refills: 0 | Status: SHIPPED | OUTPATIENT
Start: 2024-03-05 | End: 2024-04-05

## 2024-03-05 RX ORDER — DEXTROAMPHETAMINE SACCHARATE, AMPHETAMINE ASPARTATE MONOHYDRATE, DEXTROAMPHETAMINE SULFATE AND AMPHETAMINE SULFATE 7.5; 7.5; 7.5; 7.5 MG/1; MG/1; MG/1; MG/1
30 CAPSULE, EXTENDED RELEASE ORAL DAILY
Qty: 30 CAPSULE | Refills: 0 | Status: SHIPPED | OUTPATIENT
Start: 2024-03-05 | End: 2024-04-05

## 2024-03-05 RX ORDER — DEXTROAMPHETAMINE SACCHARATE, AMPHETAMINE ASPARTATE MONOHYDRATE, DEXTROAMPHETAMINE SULFATE AND AMPHETAMINE SULFATE 2.5; 2.5; 2.5; 2.5 MG/1; MG/1; MG/1; MG/1
10 CAPSULE, EXTENDED RELEASE ORAL DAILY
COMMUNITY
Start: 2024-02-05 | End: 2024-03-05

## 2024-03-05 RX ORDER — LAMOTRIGINE 25 MG/1
25 TABLET ORAL DAILY
Qty: 30 TABLET | Refills: 0 | Status: SHIPPED | OUTPATIENT
Start: 2024-03-05 | End: 2024-04-05

## 2024-03-05 ASSESSMENT — PAIN SCALES - GENERAL: PAINLEVEL: NO PAIN (0)

## 2024-03-05 NOTE — PATIENT INSTRUCTIONS
Preventive Care Advice   This is general advice given by our system to help you stay healthy. However, your care team may have specific advice just for you. Please talk to your care team about your preventive care needs.  Nutrition  Eat 5 or more servings of fruits and vegetables each day.  Try wheat bread, brown rice and whole grain pasta (instead of white bread, rice, and pasta).  Get enough calcium and vitamin D. Check the label on foods and aim for 100% of the RDA (recommended daily allowance).  Lifestyle  Exercise at least 150 minutes each week   (30 minutes a day, 5 days a week).  Do muscle strengthening activities 2 days a week. These help control your weight and prevent disease.  No smoking.  Wear sunscreen to prevent skin cancer.  Have a dental exam and cleaning every 6 months.  Yearly exams  See your health care team every year to talk about:  Any changes in your health.  Any medicines your care team has prescribed.  Preventive care, family planning, and ways to prevent chronic diseases.  Shots (vaccines)   HPV shots (up to age 26), if you've never had them before.  Hepatitis B shots (up to age 59), if you've never had them before.  COVID-19 shot: Get this shot when it's due.  Flu shot: Get a flu shot every year.  Tetanus shot: Get a tetanus shot every 10 years.  Pneumococcal, hepatitis A, and RSV shots: Ask your care team if you need these based on your risk.  Shingles shot (for age 50 and up).  General health tests  Diabetes screening:  Starting at age 35, Get screened for diabetes at least every 3 years.  If you are younger than age 35, ask your care team if you should be screened for diabetes.  Cholesterol test: At age 39, start having a cholesterol test every 5 years, or more often if advised.  Bone density scan (DEXA): At age 50, ask your care team if you should have this scan for osteoporosis (brittle bones).  Hepatitis C: Get tested at least once in your life.  STIs (sexually transmitted  infections)  Before age 24: Ask your care team if you should be screened for STIs.  After age 24: Get screened for STIs if you're at risk. You are at risk for STIs (including HIV) if:  You are sexually active with more than one person.  You don't use condoms every time.  You or a partner was diagnosed with a sexually transmitted infection.  If you are at risk for HIV, ask about PrEP medicine to prevent HIV.  Get tested for HIV at least once in your life, whether you are at risk for HIV or not.  Cancer screening tests  Cervical cancer screening: If you have a cervix, begin getting regular cervical cancer screening tests at age 21. Most people who have regular screenings with normal results can stop after age 65. Talk about this with your provider.  Breast cancer scan (mammogram): If you've ever had breasts, begin having regular mammograms starting at age 40. This is a scan to check for breast cancer.  Colon cancer screening: It is important to start screening for colon cancer at age 45.  Have a colonoscopy test every 10 years (or more often if you're at risk) Or, ask your provider about stool tests like a FIT test every year or Cologuard test every 3 years.  To learn more about your testing options, visit: https://www.[employer]/739118.pdf.  For help making a decision, visit: https://bit.ly/ft19484.  Prostate cancer screening test: If you have a prostate and are age 55 to 69, ask your provider if you would benefit from a yearly prostate cancer screening test.  Lung cancer screening: If you are a current or former smoker age 50 to 80, ask your care team if ongoing lung cancer screenings are right for you.  For informational purposes only. Not to replace the advice of your health care provider. Copyright   2023 [employer]. All rights reserved. Clinically reviewed by the [facility] Transitions Program. [employer] [id number] - REV 01/24.

## 2024-03-05 NOTE — PROGRESS NOTES
"Preventive Care Visit  [facility]  Anna Crawley Mai, MD, Family Medicine  Mar 5, 2024  {Provider  Link to SmartSet :449966}    SUBJECTIVE:   Jorge is a 38 year old, presenting for the following:  Establish Care         No data to display              HPI    Today's PHQ-2 Score:       3/5/2024    11:54 AM   PHQ-2 ( 1999 [employer])   Q1: Little interest or pleasure in doing things 0   Q2: Feeling down, depressed or hopeless 0   PHQ-2 Score 0   Q1: Little interest or pleasure in doing things Not at all   Q2: Feeling down, depressed or hopeless Not at all   PHQ-2 Score 0           {additional problems to add (Optional):001726}  Have you ever done Advance Care Planning? (For example, a Health Directive, POLST, or a discussion with a medical provider or your loved ones about your wishes): { :478608}    Social History     Tobacco Use    Smoking status: Every Day     Packs/day: 0.50     Years: 17.00     Additional pack years: 0.00     Total pack years: 8.50     Types: Cigarettes    Smokeless tobacco: Never    Tobacco comments:     started age 15   Substance Use Topics    Alcohol use: Yes     Comment: socially     {Rooming staff  Click this link to complete the Prescreen if response below is not for today's visit  Alcohol Use Prescreen >3 drinks/day or > 7 drinks/week.  If the prescreen question answer is YES, complete the full AUDIT  :658068}         No data to display            {add AUDIT responses (Optional) (A score of 7 for adult men is an indication of hazardous drinking; a score of 8 or more is an indication of an alcohol use disorder.  A score of 7 or more for adult women is an indication of hazardous drinking or an alchohol use disorder):793856}    Last PSA: No results found for: \"PSA\"    Reviewed orders with patient. Reviewed health maintenance and updated orders accordingly - { :380157}  {Chronicprobdata (optional):700086}    Reviewed and updated as needed this visit by clinical " "staff   Tobacco  Allergies  Meds  Problems  Med Hx  Surg Hx  Fam Hx  Soc   Hx        Reviewed and updated as needed this visit by Provider   Tobacco  Allergies  Meds  Problems  Med Hx  Surg Hx  Fam Hx  Soc   Hx Sexual Activity        {HISTORY OPTIONS (Optional):710692}  Review of Systems  {ROS Picklists (Optional):685403}    OBJECTIVE:   /88 (Cuff Size: Adult Large)   Pulse 90   Temp 97.6  F (36.4  C) (Temporal)   Ht 1.727 m (5' 8\")   Wt 137.9 kg (304 lb)   SpO2 99%   BMI 46.22 kg/m     Estimated body mass index is 46.22 kg/m  as calculated from the following:    Height as of this encounter: 1.727 m (5' 8\").    Weight as of this encounter: 137.9 kg (304 lb).  Physical Exam  {Exam List (Optional):828070}    {Diagnostic Test Results (Optional):153298}    ASSESSMENT/PLAN:   {Diag Picklist:444745}    {Patient advised of split billing (Optional):875550}      Counseling  {MALE COUNSELING MESSAGES:134180::\"Reviewed preventive health counseling, as reflected in patient instructions\"}      BMI  Estimated body mass index is 46.22 kg/m  as calculated from the following:    Height as of this encounter: 1.727 m (5' 8\").    Weight as of this encounter: 137.9 kg (304 lb).   {Weight Management Plan needed for ACO:404068}      He reports that he has been smoking cigarettes. He has a 8.5 pack-year smoking history. He has never used smokeless tobacco.  Nicotine/Tobacco Cessation Plan  {Nicotine/Tobacco Cessation Plan:849630}      {Counseling Resources  [place] Preventive Services Task Force  Cholesterol Screening  Health diet/nutrition  Pooled Cohorts Equation Calculator  USDA's MyPlate  ASA Prophylaxis  Lung CA Screening  Osteoporosis prevention/bone health :898054}  {Prostate Cancer Screening  Consider for men 55-69 per guidance from USPSTF :603188}    Signed Electronically by: Anna Crawley Mai, MD  "

## 2024-03-05 NOTE — PROGRESS NOTES
{PROVIDER CHARTING PREFERENCE:491806}    Christine Patel is a 38 year old, presenting for the following health issues:  Establish Care         No data to display              History of Present Illness       Reason for visit:  Medication    He eats 0-1 servings of fruits and vegetables daily.He consumes 1 sweetened beverage(s) daily.He exercises with enough effort to increase his heart rate 30 to 60 minutes per day.  He exercises with enough effort to increase his heart rate 4 days per week.   He is taking medications regularly.       {MA/LPN/RN Pre-Provider Visit Orders- hCG/UA/Strep (Optional):033566}  {SUPERLIST (Optional):334083}  {additonal problems for provider to add (Optional):993402}    {ROS Picklists (Optional):085550}      Objective    There were no vitals taken for this visit.  There is no height or weight on file to calculate BMI.  Physical Exam   {Exam List (Optional):439351}    {Diagnostic Test Results (Optional):795571}        Signed Electronically by: Anna Crawley Mai, MD  {Email feedback regarding this note to [email or web address]   :185634}

## 2024-03-17 NOTE — PROGRESS NOTES
Preventive Care Visit  formerly [facility]  Anna Crawley Mai, MD, Family Medicine  Mar 5, 2024      SUBJECTIVE:   Jorge is a 38 year old, presenting for the following:  Establish Care and Physical        3/5/2024    12:53 PM   Additional Questions   Roomed by Lisbeth MEYERS    Today's PHQ-2 Score:       3/5/2024    11:54 AM   PHQ-2 ( 1999 Pfizer)   Q1: Little interest or pleasure in doing things 0   Q2: Feeling down, depressed or hopeless 0   PHQ-2 Score 0   Q1: Little interest or pleasure in doing things Not at all   Q2: Feeling down, depressed or hopeless Not at all   PHQ-2 Score 0       Jorge is here today for physical exam, general follow-up and establish care.  He has a complex psychiatric history which include bipolar, ADHD with history of alcohol and marijuana abuse.  He been seeing the psychiatrist but was recently let go from the office because of missing the appointments.  He felt it was unfair discharge because the telephone did not work during the virtual visit.  Anyway, he is not taking medication for his bipolar and mentally he been noticing having more mood swings.  No depression or anxiety.  Was on Depakote but stopped taking it because it did not make him feel right.  Last time he took med for his bipolar was 2010.  Stated he learned to control his emotion and overall has been doing okay.  No suicidal or homicidal ideation.  No history of suicide attempt.    He has been taking Adderall for his ADHD which has been working well.  With the Adderall, he able to concentrate and multitasking.  He has been on it for years with no side effects.  The Adderall has been prescribed by the psychiatrist.  No heart palpitation or problem with sleeping with the Adderall.  No decrease in appetite or unintended weight change.  No rebound severe fatigue or sleepiness.  No personal or family history of cardiomyopathy or high blood pressure.  Denies any drugs and has been in remission from alcohol  and marijuana for years.    Otherwise, he has been doing well and has no other concern today. Last physical exam was years ago; no major medical care or procedure done since then. No headache, dizziness, or acute visual change. No runny nose, nasal congestion, coughing, fever or chill. No CP/SOB. No N/V/D/C or problem with urination.  No problems with erection.  Denied of STD risk. No leg swelling, orthopnea or dyspnea.  No muscle or joint pain.  Exercising with walking about 2 to 3 miles a day.  Drinks alcohol rarely -was a heavy drinker for years but has been in remission for several years.  The last time he smoked marijuana was in 2009, denied of other drug use.  Smokes half a pack per day for 23 years, not ready to quit.  No problem with sleeping. Feels safe at home, not working, trying to apply for disability.   with 1 son, lives alone. No weight change.  No other concern today.      Have you ever done Advance Care Planning? (For example, a Health Directive, POLST, or a discussion with a medical provider or your loved ones about your wishes): No, advance care planning information given to patient to review.  Advanced care planning was discussed at today's visit.    Social History     Tobacco Use     Smoking status: Every Day     Packs/day: 0.50     Years: 17.00     Additional pack years: 0.00     Total pack years: 8.50     Types: Cigarettes     Smokeless tobacco: Never     Tobacco comments:     started age 15   Substance Use Topics     Alcohol use: Yes     Comment: socially             3/17/2024    10:10 AM   Alcohol Use   Prescreen: >3 drinks/day or >7 drinks/week? No   AUDIT SCORE  4         3/17/2024    10:10 AM   AUDIT - Alcohol Use Disorders Identification Test - Reproduced from the World Health Organization Audit 2001 (Second Edition)   1.  How often do you have a drink containing alcohol? Monthly or less   2.  How many drinks containing alcohol do you have on a typical day when you are drinking?  "1 or 2   3.  How often do you have five or more drinks on one occasion? Less than monthly   4.  How often during the last year have you found that you were not able to stop drinking once you had started? Never   5.  How often during the last year have you failed to do what was normally expected of you because of drinking? Never   6.  How often during the last year have you needed a first drink in the morning to get yourself going after a heavy drinking session? Never   7.  How often during the last year have you had a feeling of guilt or remorse after drinking? Never   8.  How often during the last year have you been unable to remember what happened the night before because of your drinking? Never   9.  Have you or someone else been injured because of your drinking? No   10. Has a relative, friend, doctor or other health care worker been concerned about your drinking or suggested you cut down? Yes, but not in the last year   TOTAL SCORE 4       Last PSA: No results found for: \"PSA\"    Reviewed orders with patient. Reviewed health maintenance and updated orders accordingly - Yes  Patient Active Problem List   Diagnosis     Tobacco use disorder     ADHD (attention deficit hyperactivity disorder)     Bipolar 2 disorder (H)     H/O cannabis abuse     History of ETOH abuse     Morbid obesity (H)     Past Surgical History:   Procedure Laterality Date     SURGICAL HISTORY OF -   1997    Meatoplasty with urethral dilatation.       Social History     Tobacco Use     Smoking status: Every Day     Packs/day: 0.50     Years: 17.00     Additional pack years: 0.00     Total pack years: 8.50     Types: Cigarettes     Smokeless tobacco: Never     Tobacco comments:     started age 15   Substance Use Topics     Alcohol use: Yes     Comment: socially     Family History   Problem Relation Age of Onset     No Known Problems Mother      No Known Problems Father      No Known Problems Sister      No Known Problems Brother      Diabetes " "Maternal Grandmother      Cerebrovascular Disease Maternal Grandmother      No Known Problems Maternal Grandfather      No Known Problems Paternal Grandmother      Heart Disease Paternal Grandfather      No Known Problems Son          Current Outpatient Medications   Medication Sig Dispense Refill     amphetamine-dextroamphetamine (ADDERALL XR) 10 MG 24 hr capsule Take 1 capsule (10 mg) by mouth daily 30 capsule 0     amphetamine-dextroamphetamine (ADDERALL XR) 30 MG 24 hr capsule Take 1 capsule (30 mg) by mouth daily 30 capsule 0     lamoTRIgine (LAMICTAL) 25 MG tablet Take 1 tablet (25 mg) by mouth daily 30 tablet 0     Allergies   Allergen Reactions     No Known Drug Allergy        Reviewed and updated as needed this visit by clinical staff   Tobacco  Allergies  Meds  Problems  Med Hx  Surg Hx  Fam Hx  Soc   Hx        Reviewed and updated as needed this visit by Provider   Tobacco  Allergies  Meds  Problems  Med Hx  Surg Hx  Fam Hx  Soc   Hx Sexual Activity        Past Medical History:   Diagnosis Date     ADHD (attention deficit hyperactivity disorder) 2004     Bipolar 2 disorder (H) 2004     Deviated nasal septum      Obesity      Tobacco use disorder       Past Surgical History:   Procedure Laterality Date     SURGICAL HISTORY OF -   1997    Meatoplasty with urethral dilatation.     Review of Systems    Review of Systems  Constitutional, HEENT, cardiovascular, pulmonary, GI, , musculoskeletal, neuro, skin, endocrine and psych systems are negative, except as otherwise noted.    OBJECTIVE:   /88 (Cuff Size: Adult Large)   Pulse 90   Temp 97.6  F (36.4  C) (Temporal)   Ht 1.727 m (5' 8\")   Wt 137.9 kg (304 lb)   SpO2 99%   BMI 46.22 kg/m     Estimated body mass index is 46.22 kg/m  as calculated from the following:    Height as of this encounter: 1.727 m (5' 8\").    Weight as of this encounter: 137.9 kg (304 lb).    Physical Exam  GENERAL: healthy, alert and no distress  EYES: Eyes " grossly normal to inspection, PERRL and conjunctivae and sclerae normal.  No nystagmus.  All 4 visual fields are intact  HENT: Ear canals and TM's normal.  Nares are non-congested. Oropharynx is pink and moist. No tender with palpation to the sinuses.  NECK: no adenopathy, supple, no lymphadenopathy or thyromegaly.  No tender with palpation to the cervical spine or its paraspinous muscle.  RESP: lungs clear to auscultation - no rales, rhonchi or wheezes  CV: regular rate and rhythm, no murmur.  ABDOMEN: soft, nondistended, nontender, no palpable masses or organomegaly with normal bowel sounds.  MS: no gross musculoskeletal defects noted, no edema.  Walk with no limping, normal gait.  All 4 extremities are equally in strength. Ankle, knees, hips, shoulders, elbows and wrists exams normal.  Normal fine motor skills on fingers.  Back is straight, no lordosis or scoliosis.  No tender with palpation to the spine and its paraspinous muscles.  SKIN: no suspicious lesions or rashes.  NEURO: Normal strength and tone, mentation intact and speech normal.  Cranial nerves II through XII intact, DTR +2 throughout, no focal neurological deficit.  PSYCH: mentation appears normal, affect normal/bright.  Thoughts intact, no hallucination.  No suicidal or homicidal ideation.  LYMPH: no cervical or supraclavicular adenopathy.   (male): Offered and recommended but he declined.  He has no concerns about it    Diagnostic Test Results:    Labs reviewed in Epic  No results found for any visits on 03/05/24.    ASSESSMENT/PLAN:   (Z00.00) Routine general medical examination at a health care facility  (primary encounter diagnosis)  Comment: Jorge overall is doing well today.  He has a complex mental health history which is overall stable.  He is morbidly obese as well.  Discussed about safety issue, healthy diet, exercising, weight management, alcohol/substance misuse prevention, safe sex and STD prevention, contraceptions and depression  prevention.  Recommended exercising daily for at least 30 minutes.  Healthy diet also discussed and recommended.  No safety concerns..  Follow in 1 year for physical, earlier as needed.  Labs as ordered and he was encouraged to get it done as soon as possible.    Immunizations: He was recommended and offered Pneumovax, hepatitis B, influenza and COVID vaccination today but he declined.  Risks and benefits discussed and he is willing to take the risks.    STD screening: He denied STD risk.  No history of IV drug use.  He also declined STD screening today.    Colon/prostate cancer screening: Inappropriate for age.  No risks identified.      Plan: Lipid panel reflex to direct LDL Fasting,         Comprehensive metabolic panel (BMP + Alb, Alk         Phos, ALT, AST, Total. Bili, TP), TSH with free        T4 reflex, Hemoglobin A1c            (F31.81) Bipolar 2 disorder (H)  Comment: Did not tolerate to the Depakote and has not been on medication for years by choice.  Been having mood swings for years and he has learned to control and to live with it.  No depression or anxiety.  No suicidal or homicidal ideation.  No hallucination.  No active drug use or excessive alcohol consumption.  Also has ADHD, please see below for further details.    Discussed with him about the potential complications of uncontrolled mental health specially with bipolar disorder.  Treatment options also discussed.  No longer seeing psychiatrist and not interested to see one at this time.  Discussed about trying Lamictal which he is interested.  Potential side effect discussed including Geronimo-Frederick syndrome.  Follow-up in a month, earlier with any concerns or questions.    Symptoms need to be seen or call in also discussed.  He was instructed to go to the emergency room if develops suicidal homicidal ideation, plan or intentions as well as hallucination    Plan: lamoTRIgine (LAMICTAL) 25 MG tablet            (F90.1) Attention deficit  hyperactivity disorder (ADHD), predominantly hyperactive type  Comment: Has been managed by psychiatry and has been on Adderall for years.  He also did well with the Strattera before starting on the Adderall.  Reviewed the Adderall refilled history through PDMP, he has been getting the Adderall monthly, no misuse concerns identified.  No contraindication for Adderall also identified.  Blood pressures normal and there is no personal or family history of cardiomyopathy.  No drugs or excessive alcohol intake.    Will continue with the Adderall and it was refilled today.  Encouraged him to take it only as needed.  Follow-up in a month, earlier if there are any concerns or questions.    Plan: amphetamine-dextroamphetamine (ADDERALL XR) 10         MG 24 hr capsule, amphetamine-dextroamphetamine        (ADDERALL XR) 30 MG 24 hr capsule            (E66.01) Morbid obesity (H)  Comment: Today's BMI was 46.  Discussed about its potential long and short-term complications.  Also educated himabout the goal for his weight and BMI.  Exercising and healthy/portioned diet discussed and encouraged.  Offered dietitian/or weight management consultation but he declined.  No history of thyroid problem, will check the TSH level was was normal.     Screening questions for sleep apnea were negative.  Discussed about evaluating for sleep apnea but he declined.  Discussed with him about the potential complication associated with uncontrolled sleep apnea.  He is planning to work on weight loss on his own.    Plan: Hemoglobin A1c            (F17.200) Tobacco use disorder  Comment: Jorge has been smoking for 23 years, typically about 12 ppd.  Discussed with him about the long and short term consequences of tobacco smoking.  Discussed about different options for smoking cessation aids.  He was strongly encouraged to quit but is not ready yet.  Encourage to let me know when he is ready to quit.  In a mean time, I encourage him to reduce to  "amount of cigarrets smoke as much as possible.  All of his questions were answered.       (F10.11) History of ETOH abuse  Comment: Per his report, he has not been drinking heavily since 2019.  Chart review indicates that he had a DWI in 2018.  He was encouraged to keep up with the sobriety.  No craving.      (F12.11) H/O cannabis abuse  Comment: Has not smoke or consume marijuana since 2019.  Encouraged him to keep up the good work.      Patient has been advised of split billing requirements and indicates understanding: Yes      Counseling  Reviewed preventive health counseling, as reflected in patient instructions       Regular exercise       Healthy diet/nutrition       Pneumococcal Vaccine          Immunizations  Declined: Covid-19, Hepatitis B, Influenza, and Pneumococcal due to Conscientious objector             Alcohol Use        Family planning       Safe sex practices/STD prevention       Consider Hep C screening for all patients one time for ages 18-79 years       Advance Care Planning      BMI  Estimated body mass index is 46.22 kg/m  as calculated from the following:    Height as of this encounter: 1.727 m (5' 8\").    Weight as of this encounter: 137.9 kg (304 lb).   Weight management plan: Discussed healthy diet and exercise guidelines      He reports that he has been smoking cigarettes. He has a 8.5 pack-year smoking history. He has never used smokeless tobacco.  Nicotine/Tobacco Cessation Plan  Information offered: Patient not interested at this time            Signed Electronically by: Anna Crawley Mai, MD  "

## 2024-04-05 DIAGNOSIS — F31.81 BIPOLAR 2 DISORDER (H): ICD-10-CM

## 2024-04-05 DIAGNOSIS — F90.1 ATTENTION DEFICIT HYPERACTIVITY DISORDER (ADHD), PREDOMINANTLY HYPERACTIVE TYPE: ICD-10-CM

## 2024-04-05 RX ORDER — DEXTROAMPHETAMINE SACCHARATE, AMPHETAMINE ASPARTATE MONOHYDRATE, DEXTROAMPHETAMINE SULFATE AND AMPHETAMINE SULFATE 2.5; 2.5; 2.5; 2.5 MG/1; MG/1; MG/1; MG/1
10 CAPSULE, EXTENDED RELEASE ORAL DAILY
Qty: 30 CAPSULE | Refills: 0 | Status: SHIPPED | OUTPATIENT
Start: 2024-04-05 | End: 2024-05-08

## 2024-04-05 RX ORDER — LAMOTRIGINE 25 MG/1
25 TABLET ORAL DAILY
Qty: 30 TABLET | Refills: 0 | Status: SHIPPED | OUTPATIENT
Start: 2024-04-05 | End: 2024-05-08

## 2024-04-05 RX ORDER — DEXTROAMPHETAMINE SACCHARATE, AMPHETAMINE ASPARTATE MONOHYDRATE, DEXTROAMPHETAMINE SULFATE AND AMPHETAMINE SULFATE 7.5; 7.5; 7.5; 7.5 MG/1; MG/1; MG/1; MG/1
30 CAPSULE, EXTENDED RELEASE ORAL DAILY
Qty: 30 CAPSULE | Refills: 0 | Status: SHIPPED | OUTPATIENT
Start: 2024-04-05 | End: 2024-05-08

## 2024-04-05 NOTE — TELEPHONE ENCOUNTER
Patient should make sure he keeps his appointment with Dr. Cortez.  Refill sent.  Iliana Lim, CNP

## 2024-04-05 NOTE — TELEPHONE ENCOUNTER
Medication Question or Refill    Contacts         Type Contact Phone/Fax    04/05/2024 09:23 AM CDT Phone (Incoming) Jorge Madrid (Self) [phone] (M)            What medication are you calling about (include dose and sig)?: lamoTRIgine (LAMICTAL) 25 MG tablet  amphetamine-dextroamphetamine (ADDERALL XR) 10 MG 24 hr capsule       amphetamine-dextroamphetamine (ADDERALL XR) 30 MG 24 hr capsule     Preferred Pharmacy:   [address]   [address] [place] [address]   [facility] [address]  Phone: [phone] Fax: [phone]      Controlled Substance Agreement on file:   CSA -- Patient Level:    CSA: None found at the patient level.       Who prescribed the medication?: Dana    Do you need a refill? Yes    When did you use the medication last? 04/04/24    Patient offered an appointment? Yes: may/2024    Do you have any questions or concerns?  Will run out of medication before appt only have 2 day left of it      Okay to leave a detailed message?: Yes at Cell number on file:    Telephone Information:   Mobile [phone]

## 2024-05-28 ENCOUNTER — TELEPHONE (OUTPATIENT)
Dept: FAMILY MEDICINE | Facility: CLINIC | Age: 39
End: 2024-05-28
Payer: COMMERCIAL

## 2024-05-28 DIAGNOSIS — F31.81 BIPOLAR 2 DISORDER (H): ICD-10-CM

## 2024-05-28 DIAGNOSIS — F90.1 ATTENTION DEFICIT HYPERACTIVITY DISORDER (ADHD), PREDOMINANTLY HYPERACTIVE TYPE: ICD-10-CM

## 2024-05-28 NOTE — TELEPHONE ENCOUNTER
Patient called to change his appointment from 05- to 06- r/t work conflict.     Lab and office visit rescheduled.     Patient is requesting a refill on his medications to get him to  his appointment. Please advise.    Routed to provider for review and approval/denial of medications.      Denisa Hidalgo RN on 5/28/2024 at 4:44 PM

## 2024-05-29 RX ORDER — DEXTROAMPHETAMINE SACCHARATE, AMPHETAMINE ASPARTATE MONOHYDRATE, DEXTROAMPHETAMINE SULFATE AND AMPHETAMINE SULFATE 7.5; 7.5; 7.5; 7.5 MG/1; MG/1; MG/1; MG/1
30 CAPSULE, EXTENDED RELEASE ORAL DAILY
Qty: 30 CAPSULE | Refills: 0 | Status: SHIPPED | OUTPATIENT
Start: 2024-05-29 | End: 2024-06-26

## 2024-05-29 RX ORDER — DEXTROAMPHETAMINE SACCHARATE, AMPHETAMINE ASPARTATE MONOHYDRATE, DEXTROAMPHETAMINE SULFATE AND AMPHETAMINE SULFATE 2.5; 2.5; 2.5; 2.5 MG/1; MG/1; MG/1; MG/1
10 CAPSULE, EXTENDED RELEASE ORAL DAILY
Qty: 30 CAPSULE | Refills: 0 | Status: SHIPPED | OUTPATIENT
Start: 2024-05-29 | End: 2024-06-26

## 2024-05-29 RX ORDER — LAMOTRIGINE 100 MG/1
50 TABLET ORAL DAILY
Qty: 30 TABLET | Refills: 0 | Status: SHIPPED | OUTPATIENT
Start: 2024-05-29 | End: 2024-06-26

## 2024-05-29 NOTE — TELEPHONE ENCOUNTER
Patient needs a refill of Adderall 10mg and 30mg.  He states he does not feel the Lamictal is working and would like to try to dose increase.  Feli Cross MA

## 2024-05-29 NOTE — TELEPHONE ENCOUNTER
Medication refilled.  I increase the Lamictal to 50 mg.  The Adderall can be picked up on or after May 6.

## 2024-05-29 NOTE — TELEPHONE ENCOUNTER
Ok.  I need to know what medication he is requesting?  Is the Lamictal working okay? Does itsdose need to be increased?  Thank you

## 2024-06-26 ENCOUNTER — LAB (OUTPATIENT)
Dept: LAB | Facility: CLINIC | Age: 39
End: 2024-06-26
Payer: COMMERCIAL

## 2024-06-26 ENCOUNTER — OFFICE VISIT (OUTPATIENT)
Dept: FAMILY MEDICINE | Facility: CLINIC | Age: 39
End: 2024-06-26
Payer: COMMERCIAL

## 2024-06-26 VITALS
HEART RATE: 108 BPM | DIASTOLIC BLOOD PRESSURE: 76 MMHG | TEMPERATURE: 97 F | BODY MASS INDEX: 44.41 KG/M2 | HEIGHT: 68 IN | OXYGEN SATURATION: 98 % | RESPIRATION RATE: 20 BRPM | WEIGHT: 293 LBS | SYSTOLIC BLOOD PRESSURE: 108 MMHG

## 2024-06-26 DIAGNOSIS — F90.1 ATTENTION DEFICIT HYPERACTIVITY DISORDER (ADHD), PREDOMINANTLY HYPERACTIVE TYPE: Primary | ICD-10-CM

## 2024-06-26 DIAGNOSIS — F31.81 BIPOLAR 2 DISORDER (H): ICD-10-CM

## 2024-06-26 DIAGNOSIS — Z00.00 ROUTINE GENERAL MEDICAL EXAMINATION AT A HEALTH CARE FACILITY: ICD-10-CM

## 2024-06-26 DIAGNOSIS — E66.01 MORBID OBESITY (H): ICD-10-CM

## 2024-06-26 LAB
ALBUMIN SERPL BCG-MCNC: 4.5 G/DL (ref 3.5–5.2)
ALP SERPL-CCNC: 89 U/L (ref 40–150)
ALT SERPL W P-5'-P-CCNC: 28 U/L (ref 0–70)
ANION GAP SERPL CALCULATED.3IONS-SCNC: 13 MMOL/L (ref 7–15)
AST SERPL W P-5'-P-CCNC: 24 U/L (ref 0–45)
BILIRUB SERPL-MCNC: 0.5 MG/DL
BUN SERPL-MCNC: 17.7 MG/DL (ref 6–20)
CALCIUM SERPL-MCNC: 9.2 MG/DL (ref 8.6–10)
CHLORIDE SERPL-SCNC: 102 MMOL/L (ref 98–107)
CHOLEST SERPL-MCNC: 219 MG/DL
CREAT SERPL-MCNC: 1.02 MG/DL (ref 0.67–1.17)
DEPRECATED HCO3 PLAS-SCNC: 24 MMOL/L (ref 22–29)
EGFRCR SERPLBLD CKD-EPI 2021: >90 ML/MIN/1.73M2
FASTING STATUS PATIENT QL REPORTED: NO
FASTING STATUS PATIENT QL REPORTED: NO
GLUCOSE SERPL-MCNC: 89 MG/DL (ref 70–99)
HBA1C MFR BLD: 5.6 %
HDLC SERPL-MCNC: 61 MG/DL
LDLC SERPL CALC-MCNC: 127 MG/DL
NONHDLC SERPL-MCNC: 158 MG/DL
POTASSIUM SERPL-SCNC: 4.4 MMOL/L (ref 3.4–5.3)
PROT SERPL-MCNC: 7.5 G/DL (ref 6.4–8.3)
SODIUM SERPL-SCNC: 139 MMOL/L (ref 135–145)
TRIGL SERPL-MCNC: 155 MG/DL
TSH SERPL DL<=0.005 MIU/L-ACNC: 3.11 UIU/ML (ref 0.3–4.2)

## 2024-06-26 PROCEDURE — 36415 COLL VENOUS BLD VENIPUNCTURE: CPT

## 2024-06-26 PROCEDURE — 84443 ASSAY THYROID STIM HORMONE: CPT

## 2024-06-26 PROCEDURE — G2211 COMPLEX E/M VISIT ADD ON: HCPCS | Performed by: FAMILY MEDICINE

## 2024-06-26 PROCEDURE — 80053 COMPREHEN METABOLIC PANEL: CPT

## 2024-06-26 PROCEDURE — 99214 OFFICE O/P EST MOD 30 MIN: CPT | Performed by: FAMILY MEDICINE

## 2024-06-26 PROCEDURE — 83036 HEMOGLOBIN GLYCOSYLATED A1C: CPT

## 2024-06-26 PROCEDURE — 80061 LIPID PANEL: CPT

## 2024-06-26 RX ORDER — DEXTROAMPHETAMINE SACCHARATE, AMPHETAMINE ASPARTATE MONOHYDRATE, DEXTROAMPHETAMINE SULFATE AND AMPHETAMINE SULFATE 2.5; 2.5; 2.5; 2.5 MG/1; MG/1; MG/1; MG/1
10 CAPSULE, EXTENDED RELEASE ORAL PRN
Qty: 30 CAPSULE | Refills: 0 | Status: SHIPPED | OUTPATIENT
Start: 2024-06-26 | End: 2024-08-22

## 2024-06-26 RX ORDER — LAMOTRIGINE 100 MG/1
100 TABLET ORAL DAILY
Qty: 90 TABLET | Refills: 1 | Status: SHIPPED | OUTPATIENT
Start: 2024-06-26

## 2024-06-26 RX ORDER — DEXTROAMPHETAMINE SACCHARATE, AMPHETAMINE ASPARTATE MONOHYDRATE, DEXTROAMPHETAMINE SULFATE AND AMPHETAMINE SULFATE 7.5; 7.5; 7.5; 7.5 MG/1; MG/1; MG/1; MG/1
30 CAPSULE, EXTENDED RELEASE ORAL DAILY
Qty: 30 CAPSULE | Refills: 0 | Status: SHIPPED | OUTPATIENT
Start: 2024-06-26 | End: 2024-08-05

## 2024-06-26 ASSESSMENT — PAIN SCALES - GENERAL: PAINLEVEL: NO PAIN (0)

## 2024-06-26 NOTE — PROGRESS NOTES
Assessment & Plan     (F90.1) Attention deficit hyperactivity disorder (ADHD), predominantly hyperactive type  (primary encounter diagnosis)  Comment:  Has been managed by psychiatry and has been on Adderall for years.  He did not well with the Strattera before starting on the Adderall.  No misuse concerns identified.  No contraindication for Adderall also identified.  Blood pressures normal and there is no personal or family history of cardiomyopathy.  No drugs or excessive alcohol intake.     Will continue with the Adderall and it was refilled today.  Follow-up in 6 months, earlier if there are any concerns or questions.    Plan: amphetamine-dextroamphetamine (ADDERALL XR) 10         MG 24 hr capsule, amphetamine-dextroamphetamine        (ADDERALL XR) 30 MG 24 hr capsule            (F31.81) Bipolar 2 disorder (H)  Comment: Been having mood swings for years and he has learned to control and to live with it.  No depression or anxiety.  No suicidal or homicidal ideation.  No hallucination.  No active drug use or excessive alcohol consumption.  Also has ADHD, please see above     Started on the Lamictal at the last visit, tolerating it well with no side effect.  He feels it has helped with his mood swing and anxiety, he is happy with the Lamictal.  No longer seeing psychiatrist and not interested to see one at this time.  Will increase metformin 25 to 50 mg daily.  Potential side effect discussed.  Symptoms need to be seen or call in also discussed.  He was instructed to go to the emergency room if develops suicidal homicidal ideation, plan or intentions as well as hallucination    Plan: lamoTRIgine (LAMICTAL) 100 MG tablet           The longitudinal plan of care for the diagnosis(es)/condition(s) as documented were addressed during this visit. Due to the added complexity in care, I will continue to support Jorge in the subsequent management and with ongoing continuity of care.       Work on weight loss  Regular  "exercise    Subjective   Jorge is a 38 year old, presenting for the following health issues:  Recheck Medication        6/26/2024    11:14 AM   Additional Questions   Roomed by Feli QUEEN     History of Present Illness       Reason for visit:  Medication check    He eats 0-1 servings of fruits and vegetables daily.He consumes 1 sweetened beverage(s) daily.He exercises with enough effort to increase his heart rate 10 to 19 minutes per day.  He exercises with enough effort to increase his heart rate 3 or less days per week.   He is taking medications regularly.     Jorge was seen today for follow-up on his bipolar disorder and ADHD.  He was seen for them about 3 months ago, please see my last dictation for further details.  Been on Adderall for years, prescribed by the psychiatrist.  No longer seeing psychiatrist.  It has been working well which has been effective with no side effect.  With the Adderall, he is able to concentrate and multitasking.  No heart palpitation or problem with rebound fatigue in/or sleepiness.  No history of high blood pressure.  No personal or family history of cardiomyopathy.  Denies of drug use or excessive alcohol/caffeine intake.  He was also started on Lamictal at the last visit for his bipolar which was diagnosed by psychiatrist years ago.  Been taking it daily with no side effect.  He feels Lamictal has helped with his mood swing as well as anxiety.  He is happy with the Lamictal.  Interested to increase its dose if possible.  No depression.  No suicidal or homicidal ideation.  No hallucination.  Again no drugs or alcohol.        Review of Systems  Constitutional, neuro, ENT, endocrine, pulmonary, cardiac, gastrointestinal, genitourinary, musculoskeletal, integument and psychiatric systems are negative, except as otherwise noted.      Objective    /76   Pulse 108   Temp 97  F (36.1  C) (Temporal)   Resp 20   Ht 1.727 m (5' 8\")   Wt 132.9 kg (293 lb)   SpO2 98%   BMI " 44.55 kg/m    Body mass index is 44.55 kg/m .  Physical Exam   GENERAL: healthy, alert and no distress.    RESP: lungs clear to auscultation - no rales, rhonchi or wheezes  CV: regular rate and rhythm, normal S1 S2, no S3 or S4, no murmur.  Psy:  Dress appropriately.  Speech was normal and is easily comprehended.  Thought was in tact - no hallucination.         Results for orders placed or performed in visit on 06/26/24   Lipid panel reflex to direct LDL Fasting     Status: Abnormal   Result Value Ref Range    Cholesterol 219 (H) <200 mg/dL    Triglycerides 155 (H) <150 mg/dL    Direct Measure HDL 61 >=40 mg/dL    LDL Cholesterol Calculated 127 (H) <=100 mg/dL    Non HDL Cholesterol 158 (H) <130 mg/dL    Patient Fasting > 8hrs? No     Narrative    Cholesterol  Desirable:  <200 mg/dL    Triglycerides  Normal:  Less than 150 mg/dL  Borderline High:  150-199 mg/dL  High:  200-499 mg/dL  Very High:  Greater than or equal to 500 mg/dL    Direct Measure HDL  Female:  Greater than or equal to 50 mg/dL   Male:  Greater than or equal to 40 mg/dL    LDL Cholesterol  Desirable:  <100mg/dL  Above Desirable:  100-129 mg/dL   Borderline High:  130-159 mg/dL   High:  160-189 mg/dL   Very High:  >= 190 mg/dL    Non HDL Cholesterol  Desirable:  130 mg/dL  Above Desirable:  130-159 mg/dL  Borderline High:  160-189 mg/dL  High:  190-219 mg/dL  Very High:  Greater than or equal to 220 mg/dL   Comprehensive metabolic panel (BMP + Alb, Alk Phos, ALT, AST, Total. Bili, TP)     Status: None   Result Value Ref Range    Sodium 139 135 - 145 mmol/L    Potassium 4.4 3.4 - 5.3 mmol/L    Carbon Dioxide (CO2) 24 22 - 29 mmol/L    Anion Gap 13 7 - 15 mmol/L    Urea Nitrogen 17.7 6.0 - 20.0 mg/dL    Creatinine 1.02 0.67 - 1.17 mg/dL    GFR Estimate >90 >60 mL/min/1.73m2    Calcium 9.2 8.6 - 10.0 mg/dL    Chloride 102 98 - 107 mmol/L    Glucose 89 70 - 99 mg/dL    Alkaline Phosphatase 89 40 - 150 U/L    AST 24 0 - 45 U/L    ALT 28 0 - 70 U/L     Protein Total 7.5 6.4 - 8.3 g/dL    Albumin 4.5 3.5 - 5.2 g/dL    Bilirubin Total 0.5 <=1.2 mg/dL    Patient Fasting > 8hrs? No    TSH with free T4 reflex     Status: Normal   Result Value Ref Range    TSH 3.11 0.30 - 4.20 uIU/mL   Hemoglobin A1c     Status: Normal   Result Value Ref Range    Hemoglobin A1C 5.6 <5.7 %           Signed Electronically by: Anna Crawley Mai, MD

## 2024-07-02 ENCOUNTER — TELEPHONE (OUTPATIENT)
Dept: FAMILY MEDICINE | Facility: CLINIC | Age: 39
End: 2024-07-02
Payer: COMMERCIAL

## 2024-07-02 NOTE — TELEPHONE ENCOUNTER
----- Message from Anna Crawley Mai sent at 7/2/2024  8:33 AM CDT -----  Please let patient know that his cholesterol level was slightly elevated but not too bad.  No medication is needed.  In fact it is about the same as it was 14 years ago.  Continue to work on exercising, healthy diet weight and weight management as discussed.  Recheck the level in 1-2 years.  His kidney function, liver enzymes and electrolytes were normal.  No diabetes with normal hemoglobin A1c of 5.6.  Thyroid level was also normal.  Thank you

## 2024-08-05 DIAGNOSIS — F90.1 ATTENTION DEFICIT HYPERACTIVITY DISORDER (ADHD), PREDOMINANTLY HYPERACTIVE TYPE: ICD-10-CM

## 2024-08-05 RX ORDER — DEXTROAMPHETAMINE SACCHARATE, AMPHETAMINE ASPARTATE MONOHYDRATE, DEXTROAMPHETAMINE SULFATE AND AMPHETAMINE SULFATE 7.5; 7.5; 7.5; 7.5 MG/1; MG/1; MG/1; MG/1
30 CAPSULE, EXTENDED RELEASE ORAL DAILY
Qty: 30 CAPSULE | Refills: 0 | Status: SHIPPED | OUTPATIENT
Start: 2024-08-05 | End: 2024-08-29

## 2024-08-28 DIAGNOSIS — F90.1 ATTENTION DEFICIT HYPERACTIVITY DISORDER (ADHD), PREDOMINANTLY HYPERACTIVE TYPE: ICD-10-CM

## 2024-08-29 RX ORDER — DEXTROAMPHETAMINE SACCHARATE, AMPHETAMINE ASPARTATE MONOHYDRATE, DEXTROAMPHETAMINE SULFATE AND AMPHETAMINE SULFATE 2.5; 2.5; 2.5; 2.5 MG/1; MG/1; MG/1; MG/1
10 CAPSULE, EXTENDED RELEASE ORAL EVERY MORNING
Qty: 30 CAPSULE | Refills: 0 | Status: SHIPPED | OUTPATIENT
Start: 2024-08-29 | End: 2024-09-18

## 2025-01-06 DIAGNOSIS — F90.1 ATTENTION DEFICIT HYPERACTIVITY DISORDER (ADHD), PREDOMINANTLY HYPERACTIVE TYPE: ICD-10-CM

## 2025-01-07 RX ORDER — DEXTROAMPHETAMINE SACCHARATE, AMPHETAMINE ASPARTATE MONOHYDRATE, DEXTROAMPHETAMINE SULFATE AND AMPHETAMINE SULFATE 7.5; 7.5; 7.5; 7.5 MG/1; MG/1; MG/1; MG/1
30 CAPSULE, EXTENDED RELEASE ORAL DAILY
Qty: 30 CAPSULE | Refills: 0 | Status: SHIPPED | OUTPATIENT
Start: 2025-01-07

## 2025-01-07 RX ORDER — DEXTROAMPHETAMINE SACCHARATE, AMPHETAMINE ASPARTATE MONOHYDRATE, DEXTROAMPHETAMINE SULFATE AND AMPHETAMINE SULFATE 2.5; 2.5; 2.5; 2.5 MG/1; MG/1; MG/1; MG/1
10 CAPSULE, EXTENDED RELEASE ORAL EVERY MORNING
Qty: 30 CAPSULE | Refills: 0 | Status: SHIPPED | OUTPATIENT
Start: 2025-01-07

## 2025-02-03 ENCOUNTER — PATIENT OUTREACH (OUTPATIENT)
Dept: CARE COORDINATION | Facility: CLINIC | Age: 40
End: 2025-02-03
Payer: COMMERCIAL

## 2025-04-07 DIAGNOSIS — F31.81 BIPOLAR 2 DISORDER (H): ICD-10-CM

## 2025-04-07 RX ORDER — LAMOTRIGINE 100 MG/1
100 TABLET ORAL DAILY
Qty: 90 TABLET | Refills: 1 | OUTPATIENT
Start: 2025-04-07

## 2025-07-01 DIAGNOSIS — F90.1 ATTENTION DEFICIT HYPERACTIVITY DISORDER (ADHD), PREDOMINANTLY HYPERACTIVE TYPE: ICD-10-CM

## 2025-07-03 RX ORDER — DEXTROAMPHETAMINE SACCHARATE, AMPHETAMINE ASPARTATE MONOHYDRATE, DEXTROAMPHETAMINE SULFATE AND AMPHETAMINE SULFATE 7.5; 7.5; 7.5; 7.5 MG/1; MG/1; MG/1; MG/1
30 CAPSULE, EXTENDED RELEASE ORAL DAILY
Qty: 30 CAPSULE | Refills: 0 | Status: SHIPPED | OUTPATIENT
Start: 2025-07-03

## 2025-07-03 RX ORDER — DEXTROAMPHETAMINE SACCHARATE, AMPHETAMINE ASPARTATE MONOHYDRATE, DEXTROAMPHETAMINE SULFATE AND AMPHETAMINE SULFATE 2.5; 2.5; 2.5; 2.5 MG/1; MG/1; MG/1; MG/1
10 CAPSULE, EXTENDED RELEASE ORAL EVERY MORNING
Qty: 30 CAPSULE | Refills: 0 | Status: SHIPPED | OUTPATIENT
Start: 2025-07-03

## 2025-07-30 DIAGNOSIS — F90.1 ATTENTION DEFICIT HYPERACTIVITY DISORDER (ADHD), PREDOMINANTLY HYPERACTIVE TYPE: ICD-10-CM

## 2025-07-30 RX ORDER — DEXTROAMPHETAMINE SACCHARATE, AMPHETAMINE ASPARTATE MONOHYDRATE, DEXTROAMPHETAMINE SULFATE AND AMPHETAMINE SULFATE 7.5; 7.5; 7.5; 7.5 MG/1; MG/1; MG/1; MG/1
30 CAPSULE, EXTENDED RELEASE ORAL DAILY
Qty: 30 CAPSULE | Refills: 0 | Status: SHIPPED | OUTPATIENT
Start: 2025-07-30

## 2025-07-30 RX ORDER — DEXTROAMPHETAMINE SACCHARATE, AMPHETAMINE ASPARTATE MONOHYDRATE, DEXTROAMPHETAMINE SULFATE AND AMPHETAMINE SULFATE 2.5; 2.5; 2.5; 2.5 MG/1; MG/1; MG/1; MG/1
10 CAPSULE, EXTENDED RELEASE ORAL EVERY MORNING
Qty: 30 CAPSULE | Refills: 0 | Status: SHIPPED | OUTPATIENT
Start: 2025-07-30